# Patient Record
Sex: MALE | Race: WHITE | NOT HISPANIC OR LATINO
[De-identification: names, ages, dates, MRNs, and addresses within clinical notes are randomized per-mention and may not be internally consistent; named-entity substitution may affect disease eponyms.]

---

## 2017-02-08 ENCOUNTER — APPOINTMENT (OUTPATIENT)
Dept: INTERNAL MEDICINE | Facility: CLINIC | Age: 67
End: 2017-02-08

## 2017-02-08 VITALS — WEIGHT: 148 LBS | BODY MASS INDEX: 26.22 KG/M2 | HEIGHT: 63 IN

## 2017-02-08 VITALS — DIASTOLIC BLOOD PRESSURE: 88 MMHG | SYSTOLIC BLOOD PRESSURE: 148 MMHG

## 2017-02-08 VITALS — SYSTOLIC BLOOD PRESSURE: 174 MMHG | DIASTOLIC BLOOD PRESSURE: 81 MMHG

## 2017-04-19 ENCOUNTER — NON-APPOINTMENT (OUTPATIENT)
Age: 67
End: 2017-04-19

## 2017-04-19 ENCOUNTER — LABORATORY RESULT (OUTPATIENT)
Age: 67
End: 2017-04-19

## 2017-04-19 ENCOUNTER — APPOINTMENT (OUTPATIENT)
Dept: INTERNAL MEDICINE | Facility: CLINIC | Age: 67
End: 2017-04-19

## 2017-04-19 VITALS — DIASTOLIC BLOOD PRESSURE: 70 MMHG | SYSTOLIC BLOOD PRESSURE: 130 MMHG

## 2017-04-19 VITALS — BODY MASS INDEX: 25.69 KG/M2 | WEIGHT: 145 LBS | HEIGHT: 63 IN

## 2017-04-19 LAB
BILIRUB UR QL STRIP: NORMAL
CLARITY UR: CLEAR
COLLECTION METHOD: NORMAL
GLUCOSE UR-MCNC: NORMAL
HCG UR QL: 0.2 EU/DL
HGB UR QL STRIP.AUTO: NORMAL
KETONES UR-MCNC: NORMAL
LEUKOCYTE ESTERASE UR QL STRIP: NORMAL
NITRITE UR QL STRIP: NORMAL
PH UR STRIP: 6.5
PROT UR STRIP-MCNC: NORMAL
SP GR UR STRIP: 1

## 2017-04-20 LAB
25(OH)D3 SERPL-MCNC: 13.6 NG/ML
ALBUMIN SERPL ELPH-MCNC: 4.3 G/DL
ALP BLD-CCNC: 59 U/L
ALT SERPL-CCNC: 73 U/L
ANION GAP SERPL CALC-SCNC: 16 MMOL/L
AST SERPL-CCNC: 59 U/L
BASOPHILS # BLD AUTO: 0.02 K/UL
BASOPHILS NFR BLD AUTO: 0.4 %
BILIRUB SERPL-MCNC: 0.2 MG/DL
BUN SERPL-MCNC: 16 MG/DL
CALCIUM SERPL-MCNC: 9.5 MG/DL
CHLORIDE SERPL-SCNC: 95 MMOL/L
CHOLEST SERPL-MCNC: 250 MG/DL
CHOLEST/HDLC SERPL: 13.9 RATIO
CO2 SERPL-SCNC: 22 MMOL/L
CREAT SERPL-MCNC: 1.13 MG/DL
EOSINOPHIL # BLD AUTO: 0.04 K/UL
EOSINOPHIL NFR BLD AUTO: 0.7 %
FERRITIN SERPL-MCNC: 538.1 NG/ML
GLUCOSE SERPL-MCNC: 107 MG/DL
HBA1C MFR BLD HPLC: 5.8 %
HCT VFR BLD CALC: 41.5 %
HDLC SERPL-MCNC: 18 MG/DL
HGB BLD-MCNC: 14.9 G/DL
IMM GRANULOCYTES NFR BLD AUTO: 0.5 %
LDLC SERPL CALC-MCNC: NORMAL MG/DL
LYMPHOCYTES # BLD AUTO: 0.66 K/UL
LYMPHOCYTES NFR BLD AUTO: 11.7 %
MAN DIFF?: NORMAL
MCHC RBC-ENTMCNC: 33.8 PG
MCHC RBC-ENTMCNC: 35.9 GM/DL
MCV RBC AUTO: 94.1 FL
MONOCYTES # BLD AUTO: 0.41 K/UL
MONOCYTES NFR BLD AUTO: 7.3 %
NEUTROPHILS # BLD AUTO: 4.46 K/UL
NEUTROPHILS NFR BLD AUTO: 79.4 %
PLATELET # BLD AUTO: 119 K/UL
POTASSIUM SERPL-SCNC: 4.3 MMOL/L
PROT SERPL-MCNC: 7.1 G/DL
PSA SERPL-MCNC: 3.84 NG/ML
RBC # BLD: 4.41 M/UL
RBC # FLD: 12.4 %
SAVE SPECIMEN: NORMAL
SODIUM SERPL-SCNC: 133 MMOL/L
T3RU NFR SERPL: 1.08 INDEX
TRIGL SERPL-MCNC: 1740 MG/DL
TSH SERPL-ACNC: 3.09 UIU/ML
URATE SERPL-MCNC: 6.9 MG/DL
WBC # FLD AUTO: 5.62 K/UL

## 2017-04-23 ENCOUNTER — FORM ENCOUNTER (OUTPATIENT)
Age: 67
End: 2017-04-23

## 2017-04-24 ENCOUNTER — OUTPATIENT (OUTPATIENT)
Dept: OUTPATIENT SERVICES | Facility: HOSPITAL | Age: 67
LOS: 1 days | End: 2017-04-24
Payer: COMMERCIAL

## 2017-04-24 ENCOUNTER — APPOINTMENT (OUTPATIENT)
Dept: RADIOLOGY | Facility: IMAGING CENTER | Age: 67
End: 2017-04-24

## 2017-04-24 DIAGNOSIS — R07.9 CHEST PAIN, UNSPECIFIED: ICD-10-CM

## 2017-04-24 PROCEDURE — 71046 X-RAY EXAM CHEST 2 VIEWS: CPT

## 2017-05-01 ENCOUNTER — APPOINTMENT (OUTPATIENT)
Dept: INTERNAL MEDICINE | Facility: CLINIC | Age: 67
End: 2017-05-01

## 2017-05-04 ENCOUNTER — APPOINTMENT (OUTPATIENT)
Dept: INTERNAL MEDICINE | Facility: CLINIC | Age: 67
End: 2017-05-04

## 2017-05-04 ENCOUNTER — LABORATORY RESULT (OUTPATIENT)
Age: 67
End: 2017-05-04

## 2017-05-04 LAB
CHOLEST SERPL-MCNC: 180 MG/DL
CHOLEST/HDLC SERPL: 4.5 RATIO
HDLC SERPL-MCNC: 40 MG/DL
LDLC SERPL CALC-MCNC: 84 MG/DL
SODIUM SERPL-SCNC: 140 MMOL/L
TRIGL SERPL-MCNC: 278 MG/DL

## 2017-10-19 ENCOUNTER — LABORATORY RESULT (OUTPATIENT)
Age: 67
End: 2017-10-19

## 2017-10-19 ENCOUNTER — APPOINTMENT (OUTPATIENT)
Dept: INTERNAL MEDICINE | Facility: CLINIC | Age: 67
End: 2017-10-19
Payer: COMMERCIAL

## 2017-10-19 LAB
ALBUMIN SERPL ELPH-MCNC: 4.6 G/DL
ALP BLD-CCNC: 48 U/L
ALT SERPL-CCNC: 32 U/L
ANION GAP SERPL CALC-SCNC: 14 MMOL/L
AST SERPL-CCNC: 24 U/L
BASOPHILS # BLD AUTO: 0.02 K/UL
BASOPHILS NFR BLD AUTO: 0.4 %
BILIRUB SERPL-MCNC: 0.3 MG/DL
BUN SERPL-MCNC: 19 MG/DL
CALCIUM SERPL-MCNC: 9.6 MG/DL
CHLORIDE SERPL-SCNC: 95 MMOL/L
CHOLEST SERPL-MCNC: 177 MG/DL
CHOLEST/HDLC SERPL: 3.6 RATIO
CO2 SERPL-SCNC: 24 MMOL/L
CREAT SERPL-MCNC: 1.25 MG/DL
EOSINOPHIL # BLD AUTO: 0.08 K/UL
EOSINOPHIL NFR BLD AUTO: 1.6 %
GLUCOSE SERPL-MCNC: 164 MG/DL
HCT VFR BLD CALC: 44.1 %
HDLC SERPL-MCNC: 49 MG/DL
HGB BLD-MCNC: 15.1 G/DL
IMM GRANULOCYTES NFR BLD AUTO: 0.6 %
LDLC SERPL CALC-MCNC: 88 MG/DL
LYMPHOCYTES # BLD AUTO: 1.43 K/UL
LYMPHOCYTES NFR BLD AUTO: 28.4 %
MAN DIFF?: NORMAL
MCHC RBC-ENTMCNC: 32.5 PG
MCHC RBC-ENTMCNC: 34.2 GM/DL
MCV RBC AUTO: 95 FL
MONOCYTES # BLD AUTO: 0.46 K/UL
MONOCYTES NFR BLD AUTO: 9.1 %
NEUTROPHILS # BLD AUTO: 3.01 K/UL
NEUTROPHILS NFR BLD AUTO: 59.9 %
PLATELET # BLD AUTO: 155 K/UL
POTASSIUM SERPL-SCNC: 4.4 MMOL/L
PROT SERPL-MCNC: 7.4 G/DL
RBC # BLD: 4.64 M/UL
RBC # FLD: 12.6 %
SAVE SPECIMEN: NORMAL
SODIUM SERPL-SCNC: 133 MMOL/L
T3RU NFR SERPL: 1.01 INDEX
T4 SERPL-MCNC: 5.5 UG/DL
TRIGL SERPL-MCNC: 201 MG/DL
TSH SERPL-ACNC: 5.4 UIU/ML
URATE SERPL-MCNC: 7.5 MG/DL
WBC # FLD AUTO: 5.03 K/UL

## 2017-10-19 PROCEDURE — 36415 COLL VENOUS BLD VENIPUNCTURE: CPT

## 2017-10-20 LAB
25(OH)D3 SERPL-MCNC: 27.5 NG/ML
HBA1C MFR BLD HPLC: 5.7 %

## 2017-10-26 ENCOUNTER — LABORATORY RESULT (OUTPATIENT)
Age: 67
End: 2017-10-26

## 2017-10-26 ENCOUNTER — APPOINTMENT (OUTPATIENT)
Dept: INTERNAL MEDICINE | Facility: CLINIC | Age: 67
End: 2017-10-26
Payer: COMMERCIAL

## 2017-10-26 LAB — SODIUM SERPL-SCNC: 136 MMOL/L

## 2017-10-26 PROCEDURE — 36415 COLL VENOUS BLD VENIPUNCTURE: CPT

## 2017-10-27 LAB — TSH SERPL-ACNC: 4.43 UIU/ML

## 2017-12-05 ENCOUNTER — RX RENEWAL (OUTPATIENT)
Age: 67
End: 2017-12-05

## 2018-03-12 ENCOUNTER — APPOINTMENT (OUTPATIENT)
Dept: HEMATOLOGY ONCOLOGY | Facility: CLINIC | Age: 68
End: 2018-03-12

## 2018-06-01 ENCOUNTER — LABORATORY RESULT (OUTPATIENT)
Age: 68
End: 2018-06-01

## 2018-06-01 ENCOUNTER — APPOINTMENT (OUTPATIENT)
Dept: INTERNAL MEDICINE | Facility: CLINIC | Age: 68
End: 2018-06-01
Payer: MEDICARE

## 2018-06-01 ENCOUNTER — MEDICATION RENEWAL (OUTPATIENT)
Age: 68
End: 2018-06-01

## 2018-06-01 ENCOUNTER — NON-APPOINTMENT (OUTPATIENT)
Age: 68
End: 2018-06-01

## 2018-06-01 VITALS — DIASTOLIC BLOOD PRESSURE: 70 MMHG | SYSTOLIC BLOOD PRESSURE: 130 MMHG

## 2018-06-01 VITALS — BODY MASS INDEX: 25.34 KG/M2 | WEIGHT: 143 LBS | HEIGHT: 63 IN

## 2018-06-01 LAB
BILIRUB UR QL STRIP: NORMAL
CLARITY UR: CLEAR
COLLECTION METHOD: NORMAL
GLUCOSE UR-MCNC: NORMAL
HCG UR QL: 0.2 EU/DL
HGB UR QL STRIP.AUTO: NORMAL
KETONES UR-MCNC: NORMAL
LEUKOCYTE ESTERASE UR QL STRIP: NORMAL
NITRITE UR QL STRIP: NORMAL
PH UR STRIP: 6.5
PROT UR STRIP-MCNC: NORMAL
SP GR UR STRIP: 1.01

## 2018-06-01 PROCEDURE — 36415 COLL VENOUS BLD VENIPUNCTURE: CPT

## 2018-06-01 PROCEDURE — 81003 URINALYSIS AUTO W/O SCOPE: CPT | Mod: QW

## 2018-06-01 PROCEDURE — G0402 INITIAL PREVENTIVE EXAM: CPT

## 2018-06-01 PROCEDURE — 93000 ELECTROCARDIOGRAM COMPLETE: CPT | Mod: 59

## 2018-06-01 PROCEDURE — G0439: CPT

## 2018-06-01 NOTE — PHYSICAL EXAM

## 2018-06-01 NOTE — HEALTH RISK ASSESSMENT
[] : No [No falls in past year] : Patient reported no falls in the past year [0] : 2) Feeling down, depressed, or hopeless: Not at all (0) [[id number]] : 0 [Fully functional (bathing, dressing, toileting, transferring, walking, feeding)] : Fully functional (bathing, dressing, toileting, transferring, walking, feeding) [Fully functional (using the telephone, shopping, preparing meals, housekeeping, doing laundry, using] : Fully functional and needs no help or supervision to perform IADLs (using the telephone, shopping, preparing meals, housekeeping, doing laundry, using transportation, managing medications and managing finances)

## 2018-06-01 NOTE — ASSESSMENT
[FreeTextEntry1] : The patient's physical examination was positive for multiple moles and BPH. The patient was referred to urology, orthopedics, neurology, and dermatology. He was also advised about the need of the Prevnar vaccine, Pneumovax, and shingles

## 2018-06-01 NOTE — HISTORY OF PRESENT ILLNESS
[de-identified] : This is a 67-year-old patient who is here for his annual examination his main complaints today are left sciatica with left-sided back pain. He is also complaining of occasional tremors of both hands. And increasing moles on his body

## 2018-06-05 ENCOUNTER — CLINICAL ADVICE (OUTPATIENT)
Age: 68
End: 2018-06-05

## 2018-06-05 LAB
25(OH)D3 SERPL-MCNC: 25.2 NG/ML
ALBUMIN SERPL ELPH-MCNC: 4.7 G/DL
ALP BLD-CCNC: 56 U/L
ALT SERPL-CCNC: 19 U/L
ANION GAP SERPL CALC-SCNC: 16 MMOL/L
AST SERPL-CCNC: 20 U/L
BASOPHILS # BLD AUTO: 0.02 K/UL
BASOPHILS NFR BLD AUTO: 0.2 %
BILIRUB SERPL-MCNC: 0.3 MG/DL
BUN SERPL-MCNC: 16 MG/DL
CALCIUM SERPL-MCNC: 9.8 MG/DL
CHLORIDE SERPL-SCNC: 97 MMOL/L
CHOLEST SERPL-MCNC: 224 MG/DL
CHOLEST/HDLC SERPL: 4.2 RATIO
CO2 SERPL-SCNC: 24 MMOL/L
CREAT SERPL-MCNC: 1.22 MG/DL
EOSINOPHIL # BLD AUTO: 0.07 K/UL
EOSINOPHIL NFR BLD AUTO: 0.9 %
FERRITIN SERPL-MCNC: 243 NG/ML
GLUCOSE SERPL-MCNC: 115 MG/DL
HBA1C MFR BLD HPLC: 5.9 %
HCT VFR BLD CALC: 43.4 %
HDLC SERPL-MCNC: 53 MG/DL
HGB BLD-MCNC: 14.4 G/DL
IMM GRANULOCYTES NFR BLD AUTO: 0.2 %
LDLC SERPL CALC-MCNC: 139 MG/DL
LYMPHOCYTES # BLD AUTO: 1.5 K/UL
LYMPHOCYTES NFR BLD AUTO: 18.5 %
MAN DIFF?: NORMAL
MCHC RBC-ENTMCNC: 31.4 PG
MCHC RBC-ENTMCNC: 33.2 GM/DL
MCV RBC AUTO: 94.6 FL
MONOCYTES # BLD AUTO: 0.62 K/UL
MONOCYTES NFR BLD AUTO: 7.7 %
NEUTROPHILS # BLD AUTO: 5.87 K/UL
NEUTROPHILS NFR BLD AUTO: 72.5 %
PLATELET # BLD AUTO: 176 K/UL
POTASSIUM SERPL-SCNC: 4.2 MMOL/L
PROT SERPL-MCNC: 7.4 G/DL
PSA SERPL-MCNC: 4.16 NG/ML
RBC # BLD: 4.59 M/UL
RBC # FLD: 12.9 %
SAVE SPECIMEN: NORMAL
SODIUM SERPL-SCNC: 137 MMOL/L
T3RU NFR SERPL: 1.06 INDEX
T4 SERPL-MCNC: 6 UG/DL
TRIGL SERPL-MCNC: 159 MG/DL
TSH SERPL-ACNC: 5 UIU/ML
URATE SERPL-MCNC: 6.1 MG/DL
VIT B12 SERPL-MCNC: 482 PG/ML
WBC # FLD AUTO: 8.1 K/UL

## 2018-09-10 ENCOUNTER — APPOINTMENT (OUTPATIENT)
Dept: UROLOGY | Facility: CLINIC | Age: 68
End: 2018-09-10
Payer: MEDICARE

## 2018-09-10 PROCEDURE — 99213 OFFICE O/P EST LOW 20 MIN: CPT

## 2018-09-11 LAB
APPEARANCE: CLEAR
BACTERIA: NEGATIVE
BILIRUBIN URINE: NEGATIVE
BLOOD URINE: NEGATIVE
COLOR: YELLOW
GLUCOSE QUALITATIVE U: 100 MG/DL
HYALINE CASTS: 1 /LPF
KETONES URINE: ABNORMAL
LEUKOCYTE ESTERASE URINE: NEGATIVE
MICROSCOPIC-UA: NORMAL
NITRITE URINE: NEGATIVE
PH URINE: 6
PROTEIN URINE: NEGATIVE MG/DL
PSA FREE FLD-MCNC: 23.2
PSA FREE SERPL-MCNC: 0.86 NG/ML
PSA SERPL-MCNC: 3.71 NG/ML
RED BLOOD CELLS URINE: 1 /HPF
SPECIFIC GRAVITY URINE: 1.02
SQUAMOUS EPITHELIAL CELLS: 0 /HPF
UROBILINOGEN URINE: NEGATIVE MG/DL
WHITE BLOOD CELLS URINE: 0 /HPF

## 2018-09-12 LAB — BACTERIA UR CULT: NORMAL

## 2018-10-03 ENCOUNTER — LABORATORY RESULT (OUTPATIENT)
Age: 68
End: 2018-10-03

## 2018-10-03 ENCOUNTER — APPOINTMENT (OUTPATIENT)
Dept: INTERNAL MEDICINE | Facility: CLINIC | Age: 68
End: 2018-10-03
Payer: MEDICARE

## 2018-10-03 LAB
BASOPHILS # BLD AUTO: 0.04 K/UL
BASOPHILS NFR BLD AUTO: 0.7 %
EOSINOPHIL # BLD AUTO: 0.07 K/UL
EOSINOPHIL NFR BLD AUTO: 1.2 %
HCT VFR BLD CALC: 45 %
HGB BLD-MCNC: 15.2 G/DL
IMM GRANULOCYTES NFR BLD AUTO: 0.7 %
LYMPHOCYTES # BLD AUTO: 1.15 K/UL
LYMPHOCYTES NFR BLD AUTO: 20.2 %
MAN DIFF?: NORMAL
MCHC RBC-ENTMCNC: 32.5 PG
MCHC RBC-ENTMCNC: 33.8 GM/DL
MCV RBC AUTO: 96.2 FL
MONOCYTES # BLD AUTO: 0.42 K/UL
MONOCYTES NFR BLD AUTO: 7.4 %
NEUTROPHILS # BLD AUTO: 3.98 K/UL
NEUTROPHILS NFR BLD AUTO: 69.8 %
PLATELET # BLD AUTO: 180 K/UL
RBC # BLD: 4.68 M/UL
RBC # FLD: 12.4 %
SAVE SPECIMEN: NORMAL
WBC # FLD AUTO: 5.7 K/UL

## 2018-10-03 PROCEDURE — 90670 PCV13 VACCINE IM: CPT

## 2018-10-03 PROCEDURE — 36415 COLL VENOUS BLD VENIPUNCTURE: CPT

## 2018-10-03 PROCEDURE — G0009: CPT

## 2018-10-14 ENCOUNTER — CLINICAL ADVICE (OUTPATIENT)
Age: 68
End: 2018-10-14

## 2018-10-14 LAB
25(OH)D3 SERPL-MCNC: 25.8 NG/ML
ALBUMIN SERPL ELPH-MCNC: 4.6 G/DL
ALP BLD-CCNC: 45 U/L
ALT SERPL-CCNC: 38 U/L
ANION GAP SERPL CALC-SCNC: 15 MMOL/L
AST SERPL-CCNC: 31 U/L
BILIRUB SERPL-MCNC: 0.5 MG/DL
BUN SERPL-MCNC: 16 MG/DL
CALCIUM SERPL-MCNC: 9.5 MG/DL
CHLORIDE SERPL-SCNC: 95 MMOL/L
CHOLEST SERPL-MCNC: 170 MG/DL
CHOLEST/HDLC SERPL: 3.7 RATIO
CO2 SERPL-SCNC: 24 MMOL/L
CREAT SERPL-MCNC: 1.21 MG/DL
GLUCOSE SERPL-MCNC: 141 MG/DL
HBA1C MFR BLD HPLC: 6 %
HDLC SERPL-MCNC: 46 MG/DL
LDLC SERPL CALC-MCNC: 69 MG/DL
POTASSIUM SERPL-SCNC: 4.9 MMOL/L
PROT SERPL-MCNC: 7.5 G/DL
SODIUM SERPL-SCNC: 134 MMOL/L
T3RU NFR SERPL: 1.04 INDEX
T4 SERPL-MCNC: 5.8 UG/DL
TRIGL SERPL-MCNC: 274 MG/DL
TSH SERPL-ACNC: 5.29 UIU/ML
URATE SERPL-MCNC: 8.2 MG/DL

## 2018-10-17 ENCOUNTER — APPOINTMENT (OUTPATIENT)
Dept: INTERNAL MEDICINE | Facility: CLINIC | Age: 68
End: 2018-10-17
Payer: MEDICARE

## 2018-10-17 ENCOUNTER — LABORATORY RESULT (OUTPATIENT)
Age: 68
End: 2018-10-17

## 2018-10-17 PROCEDURE — 36415 COLL VENOUS BLD VENIPUNCTURE: CPT

## 2018-10-20 ENCOUNTER — RX RENEWAL (OUTPATIENT)
Age: 68
End: 2018-10-20

## 2018-10-20 ENCOUNTER — CLINICAL ADVICE (OUTPATIENT)
Age: 68
End: 2018-10-20

## 2018-10-20 LAB
SODIUM SERPL-SCNC: 136 MMOL/L
TSH SERPL-ACNC: 5.17 UIU/ML

## 2018-11-12 ENCOUNTER — MEDICATION RENEWAL (OUTPATIENT)
Age: 68
End: 2018-11-12

## 2018-11-29 ENCOUNTER — LABORATORY RESULT (OUTPATIENT)
Age: 68
End: 2018-11-29

## 2018-11-29 ENCOUNTER — RX RENEWAL (OUTPATIENT)
Age: 68
End: 2018-11-29

## 2018-11-29 ENCOUNTER — APPOINTMENT (OUTPATIENT)
Dept: INTERNAL MEDICINE | Facility: CLINIC | Age: 68
End: 2018-11-29
Payer: MEDICARE

## 2018-11-29 LAB
SAVE SPECIMEN: NORMAL
T3RU NFR SERPL: 1.12 INDEX
T4 SERPL-MCNC: 6.1 UG/DL
TSH SERPL-ACNC: 5.83 UIU/ML

## 2018-11-29 PROCEDURE — 36415 COLL VENOUS BLD VENIPUNCTURE: CPT

## 2019-01-07 ENCOUNTER — MEDICATION RENEWAL (OUTPATIENT)
Age: 69
End: 2019-01-07

## 2019-06-10 ENCOUNTER — LABORATORY RESULT (OUTPATIENT)
Age: 69
End: 2019-06-10

## 2019-06-10 ENCOUNTER — NON-APPOINTMENT (OUTPATIENT)
Age: 69
End: 2019-06-10

## 2019-06-10 ENCOUNTER — APPOINTMENT (OUTPATIENT)
Dept: INTERNAL MEDICINE | Facility: CLINIC | Age: 69
End: 2019-06-10
Payer: MEDICARE

## 2019-06-10 VITALS — SYSTOLIC BLOOD PRESSURE: 130 MMHG | DIASTOLIC BLOOD PRESSURE: 70 MMHG

## 2019-06-10 VITALS — WEIGHT: 144 LBS | HEIGHT: 63 IN | BODY MASS INDEX: 25.52 KG/M2

## 2019-06-10 DIAGNOSIS — R73.09 OTHER ABNORMAL GLUCOSE: ICD-10-CM

## 2019-06-10 LAB — SAVE SPECIMEN: NORMAL

## 2019-06-10 PROCEDURE — 81003 URINALYSIS AUTO W/O SCOPE: CPT | Mod: QW

## 2019-06-10 PROCEDURE — 93000 ELECTROCARDIOGRAM COMPLETE: CPT

## 2019-06-10 PROCEDURE — 99213 OFFICE O/P EST LOW 20 MIN: CPT | Mod: 25

## 2019-06-10 PROCEDURE — G0439: CPT | Mod: GA

## 2019-06-10 PROCEDURE — 36415 COLL VENOUS BLD VENIPUNCTURE: CPT

## 2019-06-10 NOTE — HEALTH RISK ASSESSMENT
[] : No [No falls in past year] : Patient reported no falls in the past year [0] : 2) Feeling down, depressed, or hopeless: Not at all (0) [[id number]] : 0 [Fully functional (bathing, dressing, toileting, transferring, walking, feeding)] : Fully functional (bathing, dressing, toileting, transferring, walking, feeding) [Fully functional (using the telephone, shopping, preparing meals, housekeeping, doing laundry, using] : Fully functional and needs no help or supervision to perform IADLs (using the telephone, shopping, preparing meals, housekeeping, doing laundry, using transportation, managing medications and managing finances) [Reviewed no changes] : Reviewed no changes [AdvancecareDate] : 6/10/19

## 2019-06-10 NOTE — PHYSICAL EXAM
[No Acute Distress] : no acute distress [Well Nourished] : well nourished [Well Developed] : well developed [Well-Appearing] : well-appearing [PERRL] : pupils equal round and reactive to light [EOMI] : extraocular movements intact [Normal Outer Ear/Nose] : the outer ears and nose were normal in appearance [Normal Oropharynx] : the oropharynx was normal [Supple] : supple [No Lymphadenopathy] : no lymphadenopathy [Thyroid Normal, No Nodules] : the thyroid was normal and there were no nodules present [Clear to Auscultation] : lungs were clear to auscultation bilaterally [No Accessory Muscle Use] : no accessory muscle use [Regular Rhythm] : with a regular rhythm [Normal S1, S2] : normal S1 and S2 [No Murmur] : no murmur heard [No Carotid Bruits] : no carotid bruits [Pedal Pulses Present] : the pedal pulses are present [Soft] : abdomen soft [Non Tender] : non-tender [Non-distended] : non-distended [No Masses] : no abdominal mass palpated [No HSM] : no HSM [No Hernias] : no hernias [Normal Sphincter Tone] : normal sphincter tone [Stool Occult Blood] : stool negative for occult blood [Normal Posterior Cervical Nodes] : no posterior cervical lymphadenopathy [Normal Anterior Cervical Nodes] : no anterior cervical lymphadenopathy [No CVA Tenderness] : no CVA  tenderness [No Spinal Tenderness] : no spinal tenderness [No Joint Swelling] : no joint swelling [Grossly Normal Strength/Tone] : grossly normal strength/tone [No Rash] : no rash [Normal Gait] : normal gait [No Focal Deficits] : no focal deficits [Coordination Grossly Intact] : coordination grossly intact [Deep Tendon Reflexes (DTR)] : deep tendon reflexes were 2+ and symmetric [Normal Affect] : the affect was normal [Normal Insight/Judgement] : insight and judgment were intact [de-identified] : BPH [de-identified] : Multiple moles

## 2019-06-10 NOTE — HISTORY OF PRESENT ILLNESS
[de-identified] : This is a 68-year-old gentleman with a history of hypercholesterolemia, hypothyroidism, elevated A1c, BPH who seen today for his annual examination. He feels well and denies any complaints. His last colonoscopy was 2 years ago

## 2019-06-10 NOTE — ASSESSMENT
[FreeTextEntry1] : The patient's vital signs were stable his physical examination is positive for an enlarged prostate. I referred him to urology and a PSA level was one in addition I advised him that he is due for his pneumococcal vaccine in October of 2019

## 2019-06-11 ENCOUNTER — TRANSCRIPTION ENCOUNTER (OUTPATIENT)
Age: 69
End: 2019-06-11

## 2019-06-11 ENCOUNTER — RX RENEWAL (OUTPATIENT)
Age: 69
End: 2019-06-11

## 2019-06-15 ENCOUNTER — CLINICAL ADVICE (OUTPATIENT)
Age: 69
End: 2019-06-15

## 2019-06-15 LAB
25(OH)D3 SERPL-MCNC: 22.5 NG/ML
ALBUMIN SERPL ELPH-MCNC: 4.9 G/DL
ALP BLD-CCNC: 51 U/L
ALT SERPL-CCNC: 32 U/L
ANION GAP SERPL CALC-SCNC: 14 MMOL/L
AST SERPL-CCNC: 24 U/L
BASOPHILS # BLD AUTO: 0.04 K/UL
BASOPHILS NFR BLD AUTO: 0.6 %
BILIRUB SERPL-MCNC: 0.4 MG/DL
BILIRUB UR QL STRIP: NORMAL
BUN SERPL-MCNC: 17 MG/DL
CALCIUM SERPL-MCNC: 8.6 MG/DL
CHLORIDE SERPL-SCNC: 98 MMOL/L
CHOLEST SERPL-MCNC: 199 MG/DL
CHOLEST/HDLC SERPL: 4 RATIO
CLARITY UR: CLEAR
CO2 SERPL-SCNC: 26 MMOL/L
COLLECTION METHOD: NORMAL
CREAT SERPL-MCNC: 1.02 MG/DL
EOSINOPHIL # BLD AUTO: 0.07 K/UL
EOSINOPHIL NFR BLD AUTO: 1.1 %
ESTIMATED AVERAGE GLUCOSE: 123 MG/DL
FERRITIN SERPL-MCNC: 437 NG/ML
FOLATE SERPL-MCNC: 15.9 NG/ML
GLUCOSE SERPL-MCNC: 125 MG/DL
GLUCOSE UR-MCNC: NORMAL
HBA1C MFR BLD HPLC: 5.9 %
HCG UR QL: 0.2 EU/DL
HCT VFR BLD CALC: 47.5 %
HDLC SERPL-MCNC: 50 MG/DL
HGB BLD-MCNC: 15.7 G/DL
HGB UR QL STRIP.AUTO: NORMAL
IMM GRANULOCYTES NFR BLD AUTO: 0.8 %
KETONES UR-MCNC: NORMAL
LDLC SERPL CALC-MCNC: 112 MG/DL
LEUKOCYTE ESTERASE UR QL STRIP: NORMAL
LYMPHOCYTES # BLD AUTO: 1 K/UL
LYMPHOCYTES NFR BLD AUTO: 16.1 %
MAN DIFF?: NORMAL
MCHC RBC-ENTMCNC: 32.3 PG
MCHC RBC-ENTMCNC: 33.1 GM/DL
MCV RBC AUTO: 97.7 FL
MONOCYTES # BLD AUTO: 0.62 K/UL
MONOCYTES NFR BLD AUTO: 10 %
NEUTROPHILS # BLD AUTO: 4.43 K/UL
NEUTROPHILS NFR BLD AUTO: 71.4 %
NITRITE UR QL STRIP: NORMAL
PH UR STRIP: 6
PLATELET # BLD AUTO: 149 K/UL
POTASSIUM SERPL-SCNC: 4.9 MMOL/L
PROT SERPL-MCNC: 7.3 G/DL
PROT UR STRIP-MCNC: NORMAL
PSA SERPL-MCNC: 5.12 NG/ML
RBC # BLD: 4.86 M/UL
RBC # FLD: 12.2 %
SODIUM SERPL-SCNC: 138 MMOL/L
SP GR UR STRIP: 1.01
T3RU NFR SERPL: 1 TBI
T4 SERPL-MCNC: 5 UG/DL
TRIGL SERPL-MCNC: 184 MG/DL
TSH SERPL-ACNC: 2.84 UIU/ML
URATE SERPL-MCNC: 7.6 MG/DL
VIT B12 SERPL-MCNC: 464 PG/ML
WBC # FLD AUTO: 6.21 K/UL

## 2019-06-21 ENCOUNTER — CLINICAL ADVICE (OUTPATIENT)
Age: 69
End: 2019-06-21

## 2019-07-03 ENCOUNTER — LABORATORY RESULT (OUTPATIENT)
Age: 69
End: 2019-07-03

## 2019-07-03 ENCOUNTER — APPOINTMENT (OUTPATIENT)
Dept: INTERNAL MEDICINE | Facility: CLINIC | Age: 69
End: 2019-07-03
Payer: MEDICARE

## 2019-07-03 PROCEDURE — 36415 COLL VENOUS BLD VENIPUNCTURE: CPT

## 2019-07-04 LAB
BASOPHILS # BLD AUTO: 0.04 K/UL
BASOPHILS NFR BLD AUTO: 0.7 %
EOSINOPHIL # BLD AUTO: 0.04 K/UL
EOSINOPHIL NFR BLD AUTO: 0.7 %
ERYTHROCYTE [SEDIMENTATION RATE] IN BLOOD BY WESTERGREN METHOD: 2 MM/HR
HCT VFR BLD CALC: 45.8 %
HGB BLD-MCNC: 15.2 G/DL
IMM GRANULOCYTES NFR BLD AUTO: 0.7 %
LYMPHOCYTES # BLD AUTO: 1 K/UL
LYMPHOCYTES NFR BLD AUTO: 17.3 %
MAN DIFF?: NORMAL
MCHC RBC-ENTMCNC: 32.7 PG
MCHC RBC-ENTMCNC: 33.2 GM/DL
MCV RBC AUTO: 98.5 FL
MONOCYTES # BLD AUTO: 0.62 K/UL
MONOCYTES NFR BLD AUTO: 10.7 %
NEUTROPHILS # BLD AUTO: 4.04 K/UL
NEUTROPHILS NFR BLD AUTO: 69.9 %
PLATELET # BLD AUTO: 162 K/UL
RBC # BLD: 4.65 M/UL
RBC # FLD: 12 %
WBC # FLD AUTO: 5.78 K/UL

## 2019-11-25 ENCOUNTER — APPOINTMENT (OUTPATIENT)
Dept: UROLOGY | Facility: CLINIC | Age: 69
End: 2019-11-25

## 2019-12-02 ENCOUNTER — APPOINTMENT (OUTPATIENT)
Dept: INTERNAL MEDICINE | Facility: CLINIC | Age: 69
End: 2019-12-02
Payer: MEDICARE

## 2019-12-02 ENCOUNTER — FORM ENCOUNTER (OUTPATIENT)
Age: 69
End: 2019-12-02

## 2019-12-02 VITALS — DIASTOLIC BLOOD PRESSURE: 70 MMHG | SYSTOLIC BLOOD PRESSURE: 130 MMHG

## 2019-12-02 VITALS — WEIGHT: 150 LBS | BODY MASS INDEX: 26.58 KG/M2 | HEIGHT: 63 IN

## 2019-12-02 DIAGNOSIS — M51.26 OTHER INTERVERTEBRAL DISC DISPLACEMENT, LUMBAR REGION: ICD-10-CM

## 2019-12-02 PROCEDURE — 99213 OFFICE O/P EST LOW 20 MIN: CPT

## 2019-12-02 NOTE — HISTORY OF PRESENT ILLNESS
[de-identified] : This is a 69-year-old gentleman who lifted a heavy object 2 days ago is complaining of severe lumbar pain radiating to both buttocks. He denies any loss of function. He denies any urinary dysfunction

## 2019-12-02 NOTE — ASSESSMENT
[FreeTextEntry1] : His physical examination was normal. Detailed neurological examination was also normal. I ordered an MRI of his lumbosacral spine. I started him on a Medrol pack and is also interested in bed with an ice pack

## 2019-12-03 ENCOUNTER — OUTPATIENT (OUTPATIENT)
Dept: OUTPATIENT SERVICES | Facility: HOSPITAL | Age: 69
LOS: 1 days | End: 2019-12-03
Payer: MEDICARE

## 2019-12-03 ENCOUNTER — APPOINTMENT (OUTPATIENT)
Dept: MRI IMAGING | Facility: CLINIC | Age: 69
End: 2019-12-03
Payer: MEDICARE

## 2019-12-03 DIAGNOSIS — M54.5 LOW BACK PAIN: ICD-10-CM

## 2019-12-03 PROCEDURE — 72148 MRI LUMBAR SPINE W/O DYE: CPT

## 2019-12-03 PROCEDURE — 72148 MRI LUMBAR SPINE W/O DYE: CPT | Mod: 26

## 2019-12-04 PROBLEM — M51.26 LUMBAR HERNIATED DISC: Status: ACTIVE | Noted: 2019-12-04

## 2019-12-13 ENCOUNTER — RX RENEWAL (OUTPATIENT)
Age: 69
End: 2019-12-13

## 2019-12-16 ENCOUNTER — APPOINTMENT (OUTPATIENT)
Dept: SPINE | Facility: CLINIC | Age: 69
End: 2019-12-16
Payer: MEDICARE

## 2019-12-16 VITALS
OXYGEN SATURATION: 98 % | SYSTOLIC BLOOD PRESSURE: 131 MMHG | HEART RATE: 101 BPM | HEIGHT: 63 IN | WEIGHT: 145 LBS | TEMPERATURE: 99.2 F | DIASTOLIC BLOOD PRESSURE: 79 MMHG | BODY MASS INDEX: 25.69 KG/M2

## 2019-12-16 DIAGNOSIS — Z82.0 FAMILY HISTORY OF EPILEPSY AND OTHER DISEASES OF THE NERVOUS SYSTEM: ICD-10-CM

## 2019-12-16 DIAGNOSIS — Z82.49 FAMILY HISTORY OF ISCHEMIC HEART DISEASE AND OTHER DISEASES OF THE CIRCULATORY SYSTEM: ICD-10-CM

## 2019-12-16 DIAGNOSIS — R73.03 PREDIABETES.: ICD-10-CM

## 2019-12-16 DIAGNOSIS — D36.10 BENIGN NEOPLASM OF PERIPHERAL NERVES AND AUTONOMIC NERVOUS SYSTEM, UNSPECIFIED: ICD-10-CM

## 2019-12-16 DIAGNOSIS — Z86.39 PERSONAL HISTORY OF OTHER ENDOCRINE, NUTRITIONAL AND METABOLIC DISEASE: ICD-10-CM

## 2019-12-16 PROCEDURE — 99203 OFFICE O/P NEW LOW 30 MIN: CPT

## 2019-12-17 NOTE — CONSULT LETTER
[Dear  ___] : Dear  [unfilled], [Consult Letter:] : I had the pleasure of evaluating your patient, [unfilled]. [Please see my note below.] : Please see my note below. [Consult Closing:] : Thank you very much for allowing me to participate in the care of this patient.  If you have any questions, please do not hesitate to contact me. [Sincerely,] : Sincerely, [FreeTextEntry3] : Rickey Skinner MD\par Chief of Neurosurgery [facility]\par [facility]\par

## 2019-12-17 NOTE — PHYSICAL EXAM
[Place] : oriented to place [Person] : oriented to person [Short Term Intact] : short term memory intact [Time] : oriented to time [Remote Intact] : remote memory intact [Concentration Intact] : normal concentrating ability [Span Intact] : the attention span was normal [Fluency] : fluency intact [Comprehension] : comprehension intact [Vocabulary] : adequate range of vocabulary [Current Events] : adequate knowledge of current events [Past History] : adequate knowledge of personal past history [Cranial Nerves Oculomotor (III)] : extraocular motion intact [Cranial Nerves Optic (II)] : visual acuity intact bilaterally,  pupils equal round and reactive to light [Cranial Nerves Facial (VII)] : face symmetrical [Cranial Nerves Trigeminal (V)] : facial sensation intact symmetrically [Cranial Nerves Vestibulocochlear (VIII)] : hearing was intact bilaterally [Cranial Nerves Accessory (XI - Cranial And Spinal)] : head turning and shoulder shrug symmetric [Cranial Nerves Glossopharyngeal (IX)] : tongue and palate midline [Cranial Nerves Hypoglossal (XII)] : there was no tongue deviation with protrusion [No Muscle Atrophy] : normal bulk in all four extremities [Motor Strength] : muscle strength was normal in all four extremities [Motor Tone] : muscle tone was normal in all four extremities [Sensation Tactile Decrease] : light touch was intact [Abnormal Walk] : normal gait [Balance] : balance was intact [2+] : Ankle jerk left 2+ [Full ROM] : full ROM [No Tenderness to Palpation] : no spine tenderness on palpation [No Pain with ROM] : no pain with motion in any direction [Able to toe walk] : the patient was able to toe walk [Able to heel walk] : the patient was able to heel walk [Past-pointing] : there was no past-pointing [Tremor] : no tremor present [Plantar Reflex Right Only] : normal on the right [Plantar Reflex Left Only] : normal on the left [Tolbert] : Tolbert's sign was not demonstrated [Straight-Leg Raise Test - Right] : straight leg raise  of the right leg was negative [Straight-Leg Raise Test - Left] : straight leg raise of the left leg was negative

## 2019-12-17 NOTE — REASON FOR VISIT
[New Patient Visit] : a new patient visit [Referred By: _________] : Patient was referred by GABRIELLE [Family Member] : family member [FreeTextEntry1] : Lumbar Schwannoma

## 2019-12-17 NOTE — ASSESSMENT
[FreeTextEntry1] : 68 yo male with an acute onset of lower spine pain and bilateral leg  radiculopathy in September 2019.    An L3 small schwannoma and an L3 L4 herniated disc was identified.  He is currently pain free totally asymptomatic. Normal neurologic exam.  No surgery is indicated at this time.  If his symptoms return he will follow up and a contrast MRI of the lumbar spine will be ordered.

## 2019-12-17 NOTE — HISTORY OF PRESENT ILLNESS
[> 3 months] : more  than 3 months [FreeTextEntry1] : Lumbar Radiculopathy  [de-identified] : 68 yo male with a four month history of lower back pain with bilateral leg pain and tingling.   The pain was a 10/10.  No reports of bowel or bladder symptoms.  He had seen his PCP who ordered an MRI and placed the patient on steroids.  His pain has resolved since a medrol dose ta was initiated.  He has no difficulty walking.  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  Presently the patient is totally pain free. He has no numbness, tingling or weakness. His MRI scan showed a large L3-4 disc herniation and possible tiny L3 nerve sheath tumor.

## 2020-01-06 ENCOUNTER — APPOINTMENT (OUTPATIENT)
Dept: UROLOGY | Facility: CLINIC | Age: 70
End: 2020-01-06
Payer: MEDICARE

## 2020-01-06 VITALS — HEIGHT: 63 IN | WEIGHT: 140 LBS | BODY MASS INDEX: 24.8 KG/M2

## 2020-01-06 PROCEDURE — 99213 OFFICE O/P EST LOW 20 MIN: CPT

## 2020-01-07 ENCOUNTER — RECORD ABSTRACTING (OUTPATIENT)
Age: 70
End: 2020-01-07

## 2020-01-07 DIAGNOSIS — R07.9 CHEST PAIN, UNSPECIFIED: ICD-10-CM

## 2020-01-07 DIAGNOSIS — M54.5 LOW BACK PAIN: ICD-10-CM

## 2020-01-07 LAB
ANION GAP SERPL CALC-SCNC: 16 MMOL/L
BUN SERPL-MCNC: 17 MG/DL
CALCIUM SERPL-MCNC: 9.5 MG/DL
CHLORIDE SERPL-SCNC: 95 MMOL/L
CO2 SERPL-SCNC: 25 MMOL/L
CREAT SERPL-MCNC: 1.16 MG/DL
GLUCOSE SERPL-MCNC: 146 MG/DL
POTASSIUM SERPL-SCNC: 4.6 MMOL/L
PSA FREE FLD-MCNC: 13 %
PSA FREE SERPL-MCNC: 0.78 NG/ML
PSA SERPL-MCNC: 5.78 NG/ML
SODIUM SERPL-SCNC: 136 MMOL/L

## 2020-01-08 NOTE — PHYSICAL EXAM
[General Appearance - Well Developed] : well developed [General Appearance - Well Nourished] : well nourished [Normal Appearance] : normal appearance [Well Groomed] : well groomed [General Appearance - In No Acute Distress] : no acute distress [Abdomen Soft] : soft [Abdomen Tenderness] : non-tender [Costovertebral Angle Tenderness] : no ~M costovertebral angle tenderness [Urethral Meatus] : meatus normal [Urinary Bladder Findings] : the bladder was normal on palpation [Scrotum] : the scrotum was normal [Testes Mass (___cm)] : there were no testicular masses [No Prostate Nodules] : no prostate nodules [Respiration, Rhythm And Depth] : normal respiratory rhythm and effort [] : no respiratory distress [Edema] : no peripheral edema [Exaggerated Use Of Accessory Muscles For Inspiration] : no accessory muscle use [Oriented To Time, Place, And Person] : oriented to person, place, and time [Affect] : the affect was normal [Normal Station and Gait] : the gait and station were normal for the patient's age [Mood] : the mood was normal [Not Anxious] : not anxious [No Focal Deficits] : no focal deficits [No Palpable Adenopathy] : no palpable adenopathy

## 2020-01-08 NOTE — ADDENDUM
[FreeTextEntry1] : Entered by Cody Matthews, acting as scribe for Dr. Javid Bourne.\par \par The documentation recorded by the scribe accurately reflects the service I personally performed and the decisions made by me.

## 2020-01-08 NOTE — LETTER BODY
[FreeTextEntry1] : Jaime Bello MD\par [address] [facility]\par [address]\[place] [address]\par P [phone]\par F [phone]\par \par Dear Dr. Bello,\par \par Reason for visit: Elevated PSA.\par \par This is a 69 -year-old gentleman with elevated PSA. He reports previous negative prostate biopsy with Dr. Zamarripa. Patient returns today for followup. Since his last visit, he denies any urinary complaints. He reports that he is pre-diabetic and has some difficulty managing his blood sugar. His hemoglobin A1c is stable at 5.9%. He denies any nocturia. Patient denies any changes in health. He denies any hematuria or dysuria.The past medical history, family history and social history are unchanged. All other review of systems are negative. Patient denies any changes in medications. Medication list was reconciled.\par \par On examination, the patient is a healthy-appearing gentleman in no acute distress. He is alert and oriented follows commands. He has normal mood and affect. He is normocephalic. Oral no thrush. Neck is supple. Respirations are unlabored. His abdomen is soft and nontender. Liver is nonpalpable. Bladder is nonpalpable. No CVA tenderness. Neurologically he is grossly intact. No peripheral edema. Skin without gross abnormality. He has normal male external genitalia. Normal meatus. Bilateral testes are descended intrascrotally and normal to palpation. On rectal examination, there is normal sphincter tone. The prostate is clinically benign without focal induration or nodularity.\par \par His PSA was 5.12 in June 2019, previously 3.71 in Sept 2018. Previous PSA in June 2018 and April 2017 were 4.16 and 3.83, respectively.\par His hemoglobin A1c is 5.9%, which is stable. \par \par Assessment: Elevated PSA.\par \par I counseled the patient. I reassured the patient given his history of PSA fluctuation. I recommend he repeat PSA profile to confirm diagnosis. If his PSA remains elevated he may consider prostate MRI. I counseled the patient on the procedure. In terms of his pre-diabetes, I asked that he continue monitoring his blood sugar. I counseled the patient on proper glycemic blood management. Risks and alternatives were discussed. I answered the patient questions. The patient will follow-up as directed and will contact me with any questions or concerns. Thank you for the opportunity to participate in the care of this patient. I'll keep you updated on his progress.\par \par Plan: PSA. Consider prostate MRI. Follow up as directed. \par \par PSA 5.78. I spoke with patient regarding test results. I recommended he proceed with prostate MRI. The patient will followup as directed and call me with any questions or concerns.

## 2020-01-26 ENCOUNTER — FORM ENCOUNTER (OUTPATIENT)
Age: 70
End: 2020-01-26

## 2020-01-27 ENCOUNTER — APPOINTMENT (OUTPATIENT)
Dept: MRI IMAGING | Facility: IMAGING CENTER | Age: 70
End: 2020-01-27
Payer: MEDICARE

## 2020-01-27 ENCOUNTER — OUTPATIENT (OUTPATIENT)
Dept: OUTPATIENT SERVICES | Facility: HOSPITAL | Age: 70
LOS: 1 days | End: 2020-01-27
Payer: MEDICARE

## 2020-01-27 DIAGNOSIS — R07.9 CHEST PAIN, UNSPECIFIED: ICD-10-CM

## 2020-01-27 DIAGNOSIS — R53.83 OTHER FATIGUE: ICD-10-CM

## 2020-01-27 DIAGNOSIS — M54.5 LOW BACK PAIN: ICD-10-CM

## 2020-01-27 DIAGNOSIS — M50.20 OTHER CERVICAL DISC DISPLACEMENT, UNSPECIFIED CERVICAL REGION: ICD-10-CM

## 2020-01-27 DIAGNOSIS — R97.20 ELEVATED PROSTATE SPECIFIC ANTIGEN [PSA]: ICD-10-CM

## 2020-01-27 PROCEDURE — 72197 MRI PELVIS W/O & W/DYE: CPT

## 2020-01-27 PROCEDURE — A9585: CPT

## 2020-01-27 PROCEDURE — 72197 MRI PELVIS W/O & W/DYE: CPT | Mod: 26

## 2020-03-13 ENCOUNTER — APPOINTMENT (OUTPATIENT)
Dept: INTERNAL MEDICINE | Facility: CLINIC | Age: 70
End: 2020-03-13
Payer: MEDICARE

## 2020-03-13 PROCEDURE — G0009: CPT

## 2020-03-13 PROCEDURE — 90732 PPSV23 VACC 2 YRS+ SUBQ/IM: CPT

## 2020-06-15 ENCOUNTER — RX RENEWAL (OUTPATIENT)
Age: 70
End: 2020-06-15

## 2020-07-17 LAB
ALBUMIN SERPL ELPH-MCNC: 4.8 G/DL
ALP BLD-CCNC: 59 U/L
ALT SERPL-CCNC: 51 U/L
ANION GAP SERPL CALC-SCNC: 15 MMOL/L
APPEARANCE: CLEAR
APPEARANCE: CLEAR
AST SERPL-CCNC: 42 U/L
BASOPHILS # BLD AUTO: 0.05 K/UL
BASOPHILS NFR BLD AUTO: 0.7 %
BILIRUB SERPL-MCNC: 0.3 MG/DL
BILIRUBIN URINE: NEGATIVE
BILIRUBIN URINE: NEGATIVE
BLOOD URINE: NEGATIVE
BLOOD URINE: NEGATIVE
BUN SERPL-MCNC: 14 MG/DL
CALCIUM SERPL-MCNC: 9.2 MG/DL
CHLORIDE SERPL-SCNC: 95 MMOL/L
CHOLEST SERPL-MCNC: 173 MG/DL
CHOLEST/HDLC SERPL: 3.7 RATIO
CO2 SERPL-SCNC: 24 MMOL/L
COLOR: NORMAL
COLOR: NORMAL
CREAT SERPL-MCNC: 1.02 MG/DL
EOSINOPHIL # BLD AUTO: 0.13 K/UL
EOSINOPHIL NFR BLD AUTO: 1.7 %
ESTIMATED AVERAGE GLUCOSE: 108 MG/DL
GLUCOSE QUALITATIVE U: NEGATIVE
GLUCOSE QUALITATIVE U: NEGATIVE
GLUCOSE SERPL-MCNC: 127 MG/DL
HBA1C MFR BLD HPLC: 5.4 %
HCT VFR BLD CALC: 46.9 %
HDLC SERPL-MCNC: 47 MG/DL
HGB BLD-MCNC: 15.5 G/DL
IMM GRANULOCYTES NFR BLD AUTO: 0.9 %
KETONES URINE: ABNORMAL
KETONES URINE: ABNORMAL
LDLC SERPL CALC-MCNC: 64 MG/DL
LEUKOCYTE ESTERASE URINE: NEGATIVE
LEUKOCYTE ESTERASE URINE: NEGATIVE
LYMPHOCYTES # BLD AUTO: 1.18 K/UL
LYMPHOCYTES NFR BLD AUTO: 15.8 %
MAN DIFF?: NORMAL
MCHC RBC-ENTMCNC: 32.2 PG
MCHC RBC-ENTMCNC: 33 GM/DL
MCV RBC AUTO: 97.3 FL
MONOCYTES # BLD AUTO: 0.86 K/UL
MONOCYTES NFR BLD AUTO: 11.5 %
NEUTROPHILS # BLD AUTO: 5.19 K/UL
NEUTROPHILS NFR BLD AUTO: 69.4 %
NITRITE URINE: NEGATIVE
NITRITE URINE: NEGATIVE
PH URINE: 6
PH URINE: 6
PLATELET # BLD AUTO: 152 K/UL
POTASSIUM SERPL-SCNC: 4.8 MMOL/L
PROT SERPL-MCNC: 7.3 G/DL
PROTEIN URINE: NEGATIVE
PROTEIN URINE: NEGATIVE
RBC # BLD: 4.82 M/UL
RBC # FLD: 12 %
SAVE SPECIMEN: NORMAL
SODIUM SERPL-SCNC: 134 MMOL/L
SPECIFIC GRAVITY URINE: 1.01
SPECIFIC GRAVITY URINE: 1.01
TRIGL SERPL-MCNC: 313 MG/DL
UROBILINOGEN URINE: NORMAL
UROBILINOGEN URINE: NORMAL
WBC # FLD AUTO: 7.48 K/UL

## 2020-07-29 DIAGNOSIS — R74.0 NONSPECIFIC ELEVATION OF LEVELS OF TRANSAMINASE AND LACTIC ACID DEHYDROGENASE [LDH]: ICD-10-CM

## 2020-09-03 DIAGNOSIS — Z20.828 CONTACT WITH AND (SUSPECTED) EXPOSURE TO OTHER VIRAL COMMUNICABLE DISEASES: ICD-10-CM

## 2020-09-03 LAB
ALBUMIN SERPL ELPH-MCNC: 4.8 G/DL
ALP BLD-CCNC: 46 U/L
ALT SERPL-CCNC: 53 U/L
ANION GAP SERPL CALC-SCNC: 10 MMOL/L
AST SERPL-CCNC: 33 U/L
BILIRUB SERPL-MCNC: 0.4 MG/DL
BUN SERPL-MCNC: 15 MG/DL
CALCIUM SERPL-MCNC: 9.3 MG/DL
CHLORIDE SERPL-SCNC: 96 MMOL/L
CO2 SERPL-SCNC: 27 MMOL/L
CREAT SERPL-MCNC: 1.09 MG/DL
GGT SERPL-CCNC: 79 U/L
POTASSIUM SERPL-SCNC: 4.5 MMOL/L
PROT SERPL-MCNC: 7 G/DL
SODIUM SERPL-SCNC: 133 MMOL/L

## 2020-09-21 LAB — SODIUM SERPL-SCNC: 134 MMOL/L

## 2020-12-15 ENCOUNTER — RX RENEWAL (OUTPATIENT)
Age: 70
End: 2020-12-15

## 2021-03-16 ENCOUNTER — LABORATORY RESULT (OUTPATIENT)
Age: 71
End: 2021-03-16

## 2021-03-16 ENCOUNTER — APPOINTMENT (OUTPATIENT)
Dept: INTERNAL MEDICINE | Facility: CLINIC | Age: 71
End: 2021-03-16
Payer: MEDICARE

## 2021-03-16 ENCOUNTER — NON-APPOINTMENT (OUTPATIENT)
Age: 71
End: 2021-03-16

## 2021-03-16 VITALS — BODY MASS INDEX: 25.16 KG/M2 | HEIGHT: 63 IN | WEIGHT: 142 LBS | TEMPERATURE: 97.6 F

## 2021-03-16 PROCEDURE — 99213 OFFICE O/P EST LOW 20 MIN: CPT | Mod: 25

## 2021-03-16 PROCEDURE — 36415 COLL VENOUS BLD VENIPUNCTURE: CPT

## 2021-03-16 PROCEDURE — 93000 ELECTROCARDIOGRAM COMPLETE: CPT | Mod: 59

## 2021-03-16 PROCEDURE — G0439: CPT | Mod: GA

## 2021-03-16 NOTE — ASSESSMENT
[FreeTextEntry1] : Problems\par Elevated PSA\par Mood disorder\par Herniated cervical disc\par Hypercholesterolemia hypothyroidism\par Assessment\par His blood pressure was well controlled his physical examination was normal except for obesity.  And bloods were obtained\par \par The patient was referred to cardiology for evaluation of his tachycardia

## 2021-03-16 NOTE — HISTORY OF PRESENT ILLNESS
[de-identified] : This is a 70-year-old patient with a history of elevated PSA level, herniated cervical disc, hypercholesterolemia, thyroid disorder, mood disorder who is here today for his annual well exam

## 2021-03-16 NOTE — PHYSICAL EXAM
[Normal Sphincter Tone] : normal sphincter tone [No Mass] : no mass [Testes Mass (___cm)] : there were no testicular masses [Prostate Enlargement] : the prostate was not enlarged [Prostate Tenderness] : the prostate was not tender [No Prostate Nodules] : no prostate nodules [Normal] : normal gait, coordination grossly intact, no focal deficits and deep tendon reflexes were 2+ and symmetric

## 2021-03-16 NOTE — HEALTH RISK ASSESSMENT
[No] : No [No falls in past year] : Patient reported no falls in the past year [0] : 2) Feeling down, depressed, or hopeless: Not at all (0) [Fully functional (bathing, dressing, toileting, transferring, walking, feeding)] : Fully functional (bathing, dressing, toileting, transferring, walking, feeding) [Fully functional (using the telephone, shopping, preparing meals, housekeeping, doing laundry, using] : Fully functional and needs no help or supervision to perform IADLs (using the telephone, shopping, preparing meals, housekeeping, doing laundry, using transportation, managing medications and managing finances) [] : No [[id number]] : 0

## 2021-03-17 LAB
25(OH)D3 SERPL-MCNC: 70.3 NG/ML
ALBUMIN SERPL ELPH-MCNC: 4.8 G/DL
ALP BLD-CCNC: 60 U/L
ALT SERPL-CCNC: 39 U/L
ANION GAP SERPL CALC-SCNC: 13 MMOL/L
APPEARANCE: CLEAR
AST SERPL-CCNC: 30 U/L
BACTERIA: NEGATIVE
BASOPHILS # BLD AUTO: 0.06 K/UL
BASOPHILS NFR BLD AUTO: 0.7 %
BILIRUB SERPL-MCNC: 0.5 MG/DL
BILIRUBIN URINE: NEGATIVE
BLOOD URINE: NEGATIVE
BUN SERPL-MCNC: 21 MG/DL
CALCIUM SERPL-MCNC: 9.5 MG/DL
CHLORIDE SERPL-SCNC: 95 MMOL/L
CHOLEST SERPL-MCNC: 202 MG/DL
CO2 SERPL-SCNC: 26 MMOL/L
COLOR: YELLOW
CREAT SERPL-MCNC: 1.12 MG/DL
EOSINOPHIL # BLD AUTO: 0.08 K/UL
EOSINOPHIL NFR BLD AUTO: 1 %
ESTIMATED AVERAGE GLUCOSE: 117 MG/DL
FOLATE SERPL-MCNC: >20 NG/ML
GLUCOSE QUALITATIVE U: NEGATIVE
GLUCOSE SERPL-MCNC: 148 MG/DL
HBA1C MFR BLD HPLC: 5.7 %
HCT VFR BLD CALC: 47.3 %
HDLC SERPL-MCNC: 49 MG/DL
HGB BLD-MCNC: 15.9 G/DL
HYALINE CASTS: 0 /LPF
IMM GRANULOCYTES NFR BLD AUTO: 0.7 %
KETONES URINE: NEGATIVE
LDLC SERPL CALC-MCNC: 87 MG/DL
LEUKOCYTE ESTERASE URINE: NEGATIVE
LYMPHOCYTES # BLD AUTO: 0.99 K/UL
LYMPHOCYTES NFR BLD AUTO: 12.2 %
MAN DIFF?: NORMAL
MCHC RBC-ENTMCNC: 32.7 PG
MCHC RBC-ENTMCNC: 33.6 GM/DL
MCV RBC AUTO: 97.3 FL
MICROSCOPIC-UA: NORMAL
MONOCYTES # BLD AUTO: 0.97 K/UL
MONOCYTES NFR BLD AUTO: 12 %
NEUTROPHILS # BLD AUTO: 5.95 K/UL
NEUTROPHILS NFR BLD AUTO: 73.4 %
NITRITE URINE: NEGATIVE
NONHDLC SERPL-MCNC: 152 MG/DL
PH URINE: 6.5
PLATELET # BLD AUTO: 153 K/UL
POTASSIUM SERPL-SCNC: 5.2 MMOL/L
PROT SERPL-MCNC: 7.7 G/DL
PROTEIN URINE: NEGATIVE
PSA SERPL-MCNC: 5.68 NG/ML
RBC # BLD: 4.86 M/UL
RBC # FLD: 12.4 %
RED BLOOD CELLS URINE: 4 /HPF
SODIUM SERPL-SCNC: 134 MMOL/L
SPECIFIC GRAVITY URINE: 1.01
SQUAMOUS EPITHELIAL CELLS: 0 /HPF
T3RU NFR SERPL: 1 TBI
T4 SERPL-MCNC: 6.2 UG/DL
TRIGL SERPL-MCNC: 328 MG/DL
TSH SERPL-ACNC: 2.62 UIU/ML
URATE SERPL-MCNC: 6.9 MG/DL
UROBILINOGEN URINE: NORMAL
VIT B12 SERPL-MCNC: 540 PG/ML
WBC # FLD AUTO: 8.11 K/UL
WHITE BLOOD CELLS URINE: 1 /HPF

## 2021-03-23 ENCOUNTER — OUTPATIENT (OUTPATIENT)
Dept: OUTPATIENT SERVICES | Facility: HOSPITAL | Age: 71
LOS: 1 days | End: 2021-03-23
Payer: MEDICARE

## 2021-03-23 ENCOUNTER — APPOINTMENT (OUTPATIENT)
Dept: ULTRASOUND IMAGING | Facility: IMAGING CENTER | Age: 71
End: 2021-03-23
Payer: MEDICARE

## 2021-03-23 DIAGNOSIS — R74.01 ELEVATION OF LEVELS OF LIVER TRANSAMINASE LEVELS: ICD-10-CM

## 2021-03-23 PROCEDURE — 76700 US EXAM ABDOM COMPLETE: CPT

## 2021-03-23 PROCEDURE — 76700 US EXAM ABDOM COMPLETE: CPT | Mod: 26

## 2021-03-24 ENCOUNTER — NON-APPOINTMENT (OUTPATIENT)
Age: 71
End: 2021-03-24

## 2021-03-25 ENCOUNTER — NON-APPOINTMENT (OUTPATIENT)
Age: 71
End: 2021-03-25

## 2021-03-25 ENCOUNTER — APPOINTMENT (OUTPATIENT)
Dept: CARDIOLOGY | Facility: CLINIC | Age: 71
End: 2021-03-25
Payer: MEDICARE

## 2021-03-25 VITALS
SYSTOLIC BLOOD PRESSURE: 150 MMHG | OXYGEN SATURATION: 98 % | HEART RATE: 106 BPM | DIASTOLIC BLOOD PRESSURE: 80 MMHG | BODY MASS INDEX: 25.86 KG/M2 | WEIGHT: 146 LBS

## 2021-03-25 PROCEDURE — 93000 ELECTROCARDIOGRAM COMPLETE: CPT

## 2021-03-25 PROCEDURE — 99204 OFFICE O/P NEW MOD 45 MIN: CPT

## 2021-03-25 RX ORDER — ENEMA 19; 7 G/133ML; G/133ML
7-19 ENEMA RECTAL
Qty: 2 | Refills: 0 | Status: DISCONTINUED | COMMUNITY
Start: 2020-01-07 | End: 2021-03-25

## 2021-03-25 RX ORDER — ZOSTER VACCINE RECOMBINANT, ADJUVANTED 50 MCG/0.5
50 KIT INTRAMUSCULAR
Qty: 2 | Refills: 0 | Status: DISCONTINUED | COMMUNITY
Start: 2018-06-01 | End: 2021-03-25

## 2021-03-25 RX ORDER — METHYLPREDNISOLONE 4 MG/1
4 TABLET ORAL
Qty: 1 | Refills: 1 | Status: DISCONTINUED | COMMUNITY
Start: 2019-12-02 | End: 2021-03-25

## 2021-04-08 ENCOUNTER — APPOINTMENT (OUTPATIENT)
Dept: CARDIOLOGY | Facility: CLINIC | Age: 71
End: 2021-04-08
Payer: MEDICARE

## 2021-04-08 PROCEDURE — 93306 TTE W/DOPPLER COMPLETE: CPT

## 2021-04-11 NOTE — PHYSICAL EXAM
[General Appearance - Well Developed] : well developed [General Appearance - Well Nourished] : well nourished [General Appearance - In No Acute Distress] : no acute distress [Normal Conjunctiva] : the conjunctiva exhibited no abnormalities [Normal Jugular Venous V Waves Present] : normal jugular venous V waves present [Heart Sounds] : normal S1 and S2 [Murmurs] : no murmurs present [Arterial Pulses Normal] : the arterial pulses were normal [Edema] : no peripheral edema present [Respiration, Rhythm And Depth] : normal respiratory rhythm and effort [Auscultation Breath Sounds / Voice Sounds] : lungs were clear to auscultation bilaterally [Bowel Sounds] : normal bowel sounds [Abdomen Soft] : soft [Abnormal Walk] : normal gait [Cyanosis, Localized] : no localized cyanosis [Skin Turgor] : normal skin turgor [Oriented To Time, Place, And Person] : oriented to person, place, and time

## 2021-04-11 NOTE — DISCUSSION/SUMMARY
[FreeTextEntry1] : He is a 70-year-old with a history of hypothyroidism, mood disorder, hyperlipidemia and prior atrial tachycardia.\par He now presents with recurrence of his sinus tachycardia. \par I have suggested that he undergo echocardiography in order to exclude any structural or pericardial changes that may be causing his tachycardia.  We will monitor his thyroid function.\par He will reduce his overall coffee intake and increase his exercise routine.\par Follow-up with me in 8 weeks to see if his tachycardia has resolved.

## 2021-04-11 NOTE — HISTORY OF PRESENT ILLNESS
[FreeTextEntry1] : Dear Jaime,\par Thank you for referring him for cardiovascular evaluation.  \par He is a 80-year-old with a history of paroxysmal atrial tachycardia and tachycardia who was noted to have an elevated heart rate of 90.\par He was seen in your office for routine evaluation and noted to have an elevated heart rate.\par He denies any lightheadedness, dizziness or syncope.\par He does drink a fair amount of coffee.\par Prior paroxysmal atrial tachycardia and sinus tachycardia work-up in 2015 showed normal exercise capacity with no echocardiographic evidence of ischemia.\par He has a history of gastrointestinal ulcer and mood disorder.  He is currently being treated for hypothyroidism.\par He has no history of coronary artery disease or smoking.\par \par

## 2021-04-15 ENCOUNTER — APPOINTMENT (OUTPATIENT)
Dept: UROLOGY | Facility: CLINIC | Age: 71
End: 2021-04-15
Payer: MEDICARE

## 2021-04-15 PROCEDURE — 99214 OFFICE O/P EST MOD 30 MIN: CPT

## 2021-04-15 NOTE — LETTER BODY
[FreeTextEntry1] : Jaime Bello MD\par [address] [facility]\par [address]\[place] [address]\par P [phone]\par F [phone]\par \par Dear Dr. Bello,\par \par Reason for visit: Elevated PSA. BPH. \par \par This is a 70 year-old gentleman with elevated PSA. He reports previous negative prostate biopsy with Dr. Zamarripa. The patient returns today for follow-up. Since he was last seen, the patient notes mild symptoms of BPH. Patient reports he has weak uroflow, frequency, and hesitancy. He denies any hematuria or urinary incontinence. His symptoms are aggravated by hydration. He denies any alleviating factors. He has not tried any medical therapy previously. He obtained a negative prostate MRI in Jan 2020, PI-RADS 2. Patient denies any other changes in health. He denies any pain or hematuria or dysuria. The past medical history, family history and social history are unchanged. All other review of systems are negative. Patient denies any changes in medications. Medication list was reconciled.\par \par On examination, the patient is a healthy-appearing gentleman in no acute distress. He is alert and oriented follows commands. He has normal mood and affect. He is normocephalic. Oral no thrush. Neck is supple. Respirations are unlabored. His abdomen is soft and nontender. Liver is nonpalpable. Bladder is nonpalpable. No CVA tenderness. Neurologically he is grossly intact. No peripheral edema. Skin without gross abnormality. He has normal male external genitalia. Normal meatus. Bilateral testes are descended intrascrotally and normal to palpation. On rectal examination, there is normal sphincter tone. The prostate is clinically benign without focal induration or nodularity.\par \par I personally reviewed MRI prostate images with the patient today and images demonstrated a mildly enlarged prostate gland of 43 cc without evidene of suspicious prostatic lesions, PI-RADS 2.\par \par His recent CMP demonstrated normal renal functions, creatinine 1.12. His PSA was 5.68, which is stable. His urinalysis was unremarkable.\par \par Assessment: Elevated PSA, stable. BPH, minimal symptoms.\par \par I counseled the patient on the various etiology of his symptoms. I discussed the natural history of BPH and the treatment options available. I discussed the options of conservative management with fluid in dietary restrictions, herbal therapy, medical therapy, and minimally invasive procedures. Risk and benefits were discussed. I answered his questions. Since he has minimal symptoms, the patient defers medical therapy. In terms of his elevated PSA, I reassured the patient as his PSA remains stable. I discussed with the patient that his prostate MRI was negative, PI-RADS 2. Risks and alternatives were discussed. I answered the patient questions. The patient will follow-up in 1 year and will contact me with any questions or concerns. Thank you for the opportunity to participate in the care of Mr. VELÁSQUEZ. I will keep you updated on his progress.\par \par Plan: Follow-up in 1 year.\par \par I spent 30-minutes time today on all issues related to this patient on today date of service including non face to face time.

## 2021-04-15 NOTE — ADDENDUM
[FreeTextEntry1] : Entered by Jesus Brito, acting as scribe for Dr. Javid Bourne.\par \par The documentation recorded by the scribe accurately reflects the service I personally performed and the decisions made by me.\par

## 2021-05-26 ENCOUNTER — APPOINTMENT (OUTPATIENT)
Dept: CARDIOLOGY | Facility: CLINIC | Age: 71
End: 2021-05-26
Payer: MEDICARE

## 2021-05-26 ENCOUNTER — NON-APPOINTMENT (OUTPATIENT)
Age: 71
End: 2021-05-26

## 2021-05-26 VITALS
HEART RATE: 86 BPM | SYSTOLIC BLOOD PRESSURE: 144 MMHG | WEIGHT: 146 LBS | DIASTOLIC BLOOD PRESSURE: 80 MMHG | BODY MASS INDEX: 25.86 KG/M2 | OXYGEN SATURATION: 97 %

## 2021-05-26 DIAGNOSIS — R03.0 ELEVATED BLOOD-PRESSURE READING, W/OUT DIAGNOSIS OF HYPERTENSION: ICD-10-CM

## 2021-05-26 DIAGNOSIS — R00.2 PALPITATIONS: ICD-10-CM

## 2021-05-26 PROCEDURE — 99214 OFFICE O/P EST MOD 30 MIN: CPT

## 2021-05-26 PROCEDURE — 93000 ELECTROCARDIOGRAM COMPLETE: CPT

## 2021-05-26 NOTE — DISCUSSION/SUMMARY
[FreeTextEntry1] : He is a 70-year-old with a history of hypothyroidism, mood disorder, hyperlipidemia and prior atrial tachycardia.\par Now in SR @ 72 bpm.\par No symptoms now.\par BP is borderline.\par Encouraged routine aerobic exercise. 20 to 40 min. qod. No HR target.\par Annual followup and echo.

## 2021-05-26 NOTE — HISTORY OF PRESENT ILLNESS
[FreeTextEntry1] : Feels well.\par Working. Not exercising.\par No chest pain or palpitations.\par Reviewed echo: Moderate AI unchanged from 2015.\par \par \par \par Prior:\par Dear Jaime,\par Thank you for referring him for cardiovascular evaluation.  \par He is a 70-year-old with a history of paroxysmal atrial tachycardia and tachycardia who was noted to have an elevated heart rate of 90.\par He was seen in your office for routine evaluation and noted to have an elevated heart rate.\par He denies any lightheadedness, dizziness or syncope.\par He does drink a fair amount of coffee.\par Prior paroxysmal atrial tachycardia and sinus tachycardia work-up in 2015 showed normal exercise capacity with no echocardiographic evidence of ischemia.\par He has a history of gastrointestinal ulcer and mood disorder.  He is currently being treated for hypothyroidism.\par He has no history of coronary artery disease or smoking.\par \par

## 2021-07-05 ENCOUNTER — RX RENEWAL (OUTPATIENT)
Age: 71
End: 2021-07-05

## 2021-12-14 ENCOUNTER — APPOINTMENT (OUTPATIENT)
Dept: INTERNAL MEDICINE | Facility: CLINIC | Age: 71
End: 2021-12-14

## 2021-12-30 ENCOUNTER — RX RENEWAL (OUTPATIENT)
Age: 71
End: 2021-12-30

## 2022-03-28 ENCOUNTER — LABORATORY RESULT (OUTPATIENT)
Age: 72
End: 2022-03-28

## 2022-03-28 ENCOUNTER — APPOINTMENT (OUTPATIENT)
Dept: INTERNAL MEDICINE | Facility: CLINIC | Age: 72
End: 2022-03-28
Payer: MEDICARE

## 2022-03-28 ENCOUNTER — NON-APPOINTMENT (OUTPATIENT)
Age: 72
End: 2022-03-28

## 2022-03-28 VITALS — DIASTOLIC BLOOD PRESSURE: 80 MMHG | SYSTOLIC BLOOD PRESSURE: 140 MMHG

## 2022-03-28 VITALS — HEIGHT: 63 IN | BODY MASS INDEX: 26.22 KG/M2 | WEIGHT: 148 LBS

## 2022-03-28 DIAGNOSIS — M50.20 OTHER CERVICAL DISC DISPLACEMENT, UNSPECIFIED CERVICAL REGION: ICD-10-CM

## 2022-03-28 PROCEDURE — 99213 OFFICE O/P EST LOW 20 MIN: CPT | Mod: 25

## 2022-03-28 PROCEDURE — G0439: CPT

## 2022-03-28 PROCEDURE — 93000 ELECTROCARDIOGRAM COMPLETE: CPT

## 2022-03-28 PROCEDURE — 36415 COLL VENOUS BLD VENIPUNCTURE: CPT

## 2022-03-28 NOTE — PHYSICAL EXAM
[Normal Sphincter Tone] : normal sphincter tone [No Mass] : no mass [Testes Mass (___cm)] : there were no testicular masses [Prostate Enlargement] : the prostate was not enlarged [No Prostate Nodules] : no prostate nodules [Prostate Tenderness] : the prostate was not tender [Normal] : normal gait, coordination grossly intact, no focal deficits and deep tendon reflexes were 2+ and symmetric

## 2022-03-28 NOTE — HISTORY OF PRESENT ILLNESS
[de-identified] : This is a 71 -year-old patient with a history of elevated PSA level, herniated cervical disc, hypercholesterolemia, thyroid disorder, mood disorder who is here today for his annual well exam

## 2022-03-28 NOTE — HEALTH RISK ASSESSMENT
[No] : No [No falls in past year] : Patient reported no falls in the past year [0] : 2) Feeling down, depressed, or hopeless: Not at all (0) [PHQ-2 Negative - No further assessment needed] : PHQ-2 Negative - No further assessment needed [[id number]] : 0 [Fully functional (bathing, dressing, toileting, transferring, walking, feeding)] : Fully functional (bathing, dressing, toileting, transferring, walking, feeding) [Fully functional (using the telephone, shopping, preparing meals, housekeeping, doing laundry, using] : Fully functional and needs no help or supervision to perform IADLs (using the telephone, shopping, preparing meals, housekeeping, doing laundry, using transportation, managing medications and managing finances)

## 2022-03-28 NOTE — ASSESSMENT
[FreeTextEntry1] : Problems\par Elevated PSA\par Mood disorder\par Herniated cervical disc\par Hypercholesterolemia hypothyroidism\par Assessment\par His blood pressure was well controlled his physical examination was normal except for obesity.  And bloods were obtained\par \par The patient was referred to cardiology for evaluation of his tachycardia and mitral insufficiency

## 2022-03-29 LAB
25(OH)D3 SERPL-MCNC: 31.2 NG/ML
ALBUMIN SERPL ELPH-MCNC: 4.6 G/DL
ALP BLD-CCNC: 81 U/L
ALT SERPL-CCNC: 24 U/L
ANION GAP SERPL CALC-SCNC: 12 MMOL/L
APPEARANCE: CLEAR
AST SERPL-CCNC: 23 U/L
BACTERIA: NEGATIVE
BASOPHILS # BLD AUTO: 0.05 K/UL
BASOPHILS NFR BLD AUTO: 0.7 %
BILIRUB SERPL-MCNC: 0.2 MG/DL
BILIRUBIN URINE: NEGATIVE
BLOOD URINE: NEGATIVE
BUN SERPL-MCNC: 12 MG/DL
CALCIUM SERPL-MCNC: 9.4 MG/DL
CHLORIDE SERPL-SCNC: 100 MMOL/L
CHOLEST SERPL-MCNC: 197 MG/DL
CO2 SERPL-SCNC: 26 MMOL/L
COLOR: COLORLESS
CREAT SERPL-MCNC: 1.22 MG/DL
EGFR: 64 ML/MIN/1.73M2
EOSINOPHIL # BLD AUTO: 0.07 K/UL
EOSINOPHIL NFR BLD AUTO: 1 %
ESTIMATED AVERAGE GLUCOSE: 123 MG/DL
FERRITIN SERPL-MCNC: 353 NG/ML
FOLATE SERPL-MCNC: 7.3 NG/ML
GLUCOSE QUALITATIVE U: NORMAL
GLUCOSE SERPL-MCNC: 154 MG/DL
HBA1C MFR BLD HPLC: 5.9 %
HCT VFR BLD CALC: 43.4 %
HDLC SERPL-MCNC: 48 MG/DL
HGB BLD-MCNC: 14.4 G/DL
HYALINE CASTS: 1 /LPF
IMM GRANULOCYTES NFR BLD AUTO: 0.4 %
KETONES URINE: NEGATIVE
LDLC SERPL CALC-MCNC: 88 MG/DL
LEUKOCYTE ESTERASE URINE: NEGATIVE
LYMPHOCYTES # BLD AUTO: 1.15 K/UL
LYMPHOCYTES NFR BLD AUTO: 16.8 %
MAN DIFF?: NORMAL
MCHC RBC-ENTMCNC: 32 PG
MCHC RBC-ENTMCNC: 33.2 GM/DL
MCV RBC AUTO: 96.4 FL
MICROSCOPIC-UA: NORMAL
MONOCYTES # BLD AUTO: 0.69 K/UL
MONOCYTES NFR BLD AUTO: 10.1 %
NEUTROPHILS # BLD AUTO: 4.85 K/UL
NEUTROPHILS NFR BLD AUTO: 71 %
NITRITE URINE: NEGATIVE
NONHDLC SERPL-MCNC: 149 MG/DL
PH URINE: 6.5
PLATELET # BLD AUTO: 156 K/UL
POTASSIUM SERPL-SCNC: 4.5 MMOL/L
PROT SERPL-MCNC: 6.6 G/DL
PROTEIN URINE: NEGATIVE
PSA SERPL-MCNC: 4.63 NG/ML
RBC # BLD: 4.5 M/UL
RBC # FLD: 11.8 %
RED BLOOD CELLS URINE: 0 /HPF
SODIUM SERPL-SCNC: 138 MMOL/L
SPECIFIC GRAVITY URINE: 1
SQUAMOUS EPITHELIAL CELLS: 0 /HPF
T3RU NFR SERPL: 1.1 TBI
T4 SERPL-MCNC: 6.3 UG/DL
TRIGL SERPL-MCNC: 307 MG/DL
TSH SERPL-ACNC: 2.13 UIU/ML
URATE SERPL-MCNC: 6.1 MG/DL
UROBILINOGEN URINE: NORMAL
VIT B12 SERPL-MCNC: 466 PG/ML
WBC # FLD AUTO: 6.84 K/UL
WHITE BLOOD CELLS URINE: 1 /HPF

## 2022-07-29 ENCOUNTER — RX RENEWAL (OUTPATIENT)
Age: 72
End: 2022-07-29

## 2022-08-30 ENCOUNTER — APPOINTMENT (OUTPATIENT)
Dept: PSYCHIATRY | Facility: CLINIC | Age: 72
End: 2022-08-30

## 2022-08-30 PROCEDURE — 99205 OFFICE O/P NEW HI 60 MIN: CPT

## 2022-09-29 ENCOUNTER — APPOINTMENT (OUTPATIENT)
Dept: UROLOGY | Facility: CLINIC | Age: 72
End: 2022-09-29

## 2022-09-29 ENCOUNTER — LABORATORY RESULT (OUTPATIENT)
Age: 72
End: 2022-09-29

## 2022-09-29 ENCOUNTER — APPOINTMENT (OUTPATIENT)
Dept: INTERNAL MEDICINE | Facility: CLINIC | Age: 72
End: 2022-09-29

## 2022-09-29 VITALS — BODY MASS INDEX: 26.58 KG/M2 | HEIGHT: 63 IN | WEIGHT: 150 LBS

## 2022-09-29 VITALS — DIASTOLIC BLOOD PRESSURE: 70 MMHG | SYSTOLIC BLOOD PRESSURE: 130 MMHG

## 2022-09-29 DIAGNOSIS — R97.20 ELEVATED PROSTATE, SPECIFIC ANTIGEN [PSA]: ICD-10-CM

## 2022-09-29 PROCEDURE — 99214 OFFICE O/P EST MOD 30 MIN: CPT

## 2022-09-29 PROCEDURE — 36415 COLL VENOUS BLD VENIPUNCTURE: CPT | Mod: 59

## 2022-09-29 PROCEDURE — 99214 OFFICE O/P EST MOD 30 MIN: CPT | Mod: 25

## 2022-09-29 NOTE — ASSESSMENT
[FreeTextEntry1] : Problems\par Mild to moderate aortic insufficiency-the patient's last echocardiogram was over a year ago.  In addition he is complaining of sinus tachycardia.  I referred him to cardiology for an echo and stress test\par Hypercholesterolemia-he continues on his atorvastatin and bloods were drawn to check his cholesterol and liver enzymes\par Hypothyroidism-he continues on Synthroid 50mcg daily and his TSH level was drawn today\par Mood disorder-he continues on his amitriptyline 100 mg daily and Abilify 2 mg a day\par

## 2022-09-29 NOTE — HISTORY OF PRESENT ILLNESS
[de-identified] : This is a 71-year-old gentleman with a history of mild to moderate aortic insufficiency, hypercholesterolemia, elevated PSA level, mood disorder who is here for follow-up visit

## 2022-09-29 NOTE — HISTORY OF PRESENT ILLNESS
[de-identified] : This is a 71-year-old gentleman with a history of mild to moderate aortic insufficiency, hypercholesterolemia, elevated PSA level, mood disorder who is here for follow-up visit

## 2022-09-30 ENCOUNTER — TRANSCRIPTION ENCOUNTER (OUTPATIENT)
Age: 72
End: 2022-09-30

## 2022-09-30 LAB
ALBUMIN SERPL ELPH-MCNC: 4.7 G/DL
ALP BLD-CCNC: 58 U/L
ALT SERPL-CCNC: 41 U/L
ANION GAP SERPL CALC-SCNC: 14 MMOL/L
APPEARANCE: CLEAR
AST SERPL-CCNC: 31 U/L
BACTERIA: NEGATIVE
BASOPHILS # BLD AUTO: 0.05 K/UL
BASOPHILS NFR BLD AUTO: 0.6 %
BILIRUB SERPL-MCNC: 0.6 MG/DL
BILIRUBIN URINE: NEGATIVE
BLOOD URINE: NEGATIVE
BUN SERPL-MCNC: 13 MG/DL
CALCIUM SERPL-MCNC: 9.4 MG/DL
CHLORIDE SERPL-SCNC: 95 MMOL/L
CHOLEST SERPL-MCNC: 176 MG/DL
CO2 SERPL-SCNC: 25 MMOL/L
COLOR: NORMAL
CREAT SERPL-MCNC: 1.06 MG/DL
EGFR: 76 ML/MIN/1.73M2
EOSINOPHIL # BLD AUTO: 0.04 K/UL
EOSINOPHIL NFR BLD AUTO: 0.5 %
ESTIMATED AVERAGE GLUCOSE: 123 MG/DL
FERRITIN SERPL-MCNC: 545 NG/ML
FOLATE SERPL-MCNC: 10.2 NG/ML
GLUCOSE QUALITATIVE U: NEGATIVE
GLUCOSE SERPL-MCNC: 127 MG/DL
HBA1C MFR BLD HPLC: 5.9 %
HCT VFR BLD CALC: 41.5 %
HDLC SERPL-MCNC: 37 MG/DL
HGB BLD-MCNC: 14.1 G/DL
HYALINE CASTS: 0 /LPF
IMM GRANULOCYTES NFR BLD AUTO: 1 %
KETONES URINE: NEGATIVE
LDLC SERPL CALC-MCNC: NORMAL MG/DL
LEUKOCYTE ESTERASE URINE: NEGATIVE
LYMPHOCYTES # BLD AUTO: 0.89 K/UL
LYMPHOCYTES NFR BLD AUTO: 10.7 %
MAN DIFF?: NORMAL
MCHC RBC-ENTMCNC: 32.8 PG
MCHC RBC-ENTMCNC: 34 GM/DL
MCV RBC AUTO: 96.5 FL
MICROSCOPIC-UA: NORMAL
MONOCYTES # BLD AUTO: 0.71 K/UL
MONOCYTES NFR BLD AUTO: 8.5 %
NEUTROPHILS # BLD AUTO: 6.58 K/UL
NEUTROPHILS NFR BLD AUTO: 78.7 %
NITRITE URINE: NEGATIVE
NONHDLC SERPL-MCNC: 138 MG/DL
PH URINE: 6.5
PLATELET # BLD AUTO: 152 K/UL
POTASSIUM SERPL-SCNC: 4.2 MMOL/L
PROT SERPL-MCNC: 6.9 G/DL
PROTEIN URINE: NEGATIVE
RBC # BLD: 4.3 M/UL
RBC # FLD: 12.3 %
RED BLOOD CELLS URINE: 0 /HPF
SODIUM SERPL-SCNC: 134 MMOL/L
SPECIFIC GRAVITY URINE: 1.01
SQUAMOUS EPITHELIAL CELLS: 0 /HPF
T3RU NFR SERPL: 1 TBI
T4 SERPL-MCNC: 6.7 UG/DL
TRIGL SERPL-MCNC: 448 MG/DL
TSH SERPL-ACNC: 3.58 UIU/ML
URATE SERPL-MCNC: 6.6 MG/DL
UROBILINOGEN URINE: NORMAL
VIT B12 SERPL-MCNC: 514 PG/ML
WBC # FLD AUTO: 8.35 K/UL
WHITE BLOOD CELLS URINE: 0 /HPF

## 2022-10-12 NOTE — HISTORY OF PRESENT ILLNESS
[FreeTextEntry1] : Follow-up of a PSA.  Recent PSA is 4.63.  Patient had previous PI-RADS 2 MRI in 2020.\par \par Patient has BPH.  However has minimal bother.  Declines medication.  Follow-up 1 year.\par \par Please refer to URO Consult note

## 2022-10-12 NOTE — LETTER BODY
[FreeTextEntry1] : Jaime Bello MD\par [address]\par [address]\[place] [address]\par P [phone]\par F [phone]\par \par Dear Dr. Bello,\par \par Reason for visit: Elevated PSA. BPH. \par \par This is a 71 year-old gentleman with elevated PSA. He reports previous negative prostate biopsy with Dr. Zamarripa. He obtained a negative prostate MRI in Jan 2020, PI-RADS 2. The patient returns today for follow-up. Since he was last seen, the patient continues to notemild symptoms of BPH. Patient reports he has weak uroflow, frequency, and hesitancy. He denies any hematuria or urinary incontinence. His symptoms are aggravated by hydration. He denies any alleviating factors. He has not tried any medical therapy previously. Patient denies any other changes in health. He denies any pain or hematuria or dysuria. The past medical history, family history and social history are unchanged. All other review of systems are negative. Patient denies any changes in medications. Medication list was reconciled.\par \par On examination, the patient is a healthy-appearing gentleman in no acute distress. He is alert and oriented follows commands. He has normal mood and affect. He is normocephalic. Oral no thrush. Neck is supple. Respirations are unlabored. His abdomen is soft and nontender. Liver is nonpalpable. Bladder is nonpalpable. No CVA tenderness. Neurologically he is grossly intact. No peripheral edema. Skin without gross abnormality. He has normal male external genitalia. Normal meatus. Bilateral testes are descended intrascrotally and normal to palpation. On rectal examination, there is normal sphincter tone. The prostate is clinically benign without focal induration or nodularity.\par \par His PSA was 4.63, which is elevated but improved. His previous PSA was 5.68.\par \par Assessment: Elevated PSA, stable. BPH, minimal symptoms.\par \par I counseled the patient on the various etiology of his symptoms. I discussed the natural history of BPH and the treatment options available. I discussed the options of conservative management with fluid in dietary restrictions, herbal therapy, medical therapy, and minimally invasive procedures. Risk and benefits were discussed. I answered his questions. Since he has minimal symptoms, the patient defers medical therapy. In terms of his elevated PSA, I reassured the patient as his PSA remains stable. I discussed with the patient that his prostate MRI was negative, PI-RADS 2. Risks and alternatives were discussed. I answered the patient questions. The patient will follow-up in 1 year and will contact me with any questions or concerns. Thank you for the opportunity to participate in the care of Mr. VELÁSQUEZ. I will keep you updated on his progress.\par \par Plan: Follow-up in 1 year.

## 2022-10-12 NOTE — ADDENDUM
[FreeTextEntry1] : Entered by QUIQUE CHÁVEZ, acting as scribe for Dr. Javid Bourne.\par The documentation recorded by the scribe accurately reflects the service I personally performed and the decisions made by me.

## 2022-10-31 ENCOUNTER — APPOINTMENT (OUTPATIENT)
Dept: PSYCHIATRY | Facility: CLINIC | Age: 72
End: 2022-10-31

## 2022-10-31 PROCEDURE — 99214 OFFICE O/P EST MOD 30 MIN: CPT

## 2022-10-31 NOTE — CURRENT PSYCHIATRIC SYMPTOMS
[de-identified] : Denied [de-identified] : Denied [de-identified] : Denied [de-identified] : Denied [de-identified] : None [de-identified] : None [de-identified] : None

## 2022-10-31 NOTE — SOCIAL HISTORY
[FreeTextEntry1] : Patient grew up in [facility].  He attended [employer] graduating 1969.  High school was good he was a good student he then went to [employer] graduating in 1972 with an associates degree in electrical engineering.  In college she partied a lot with member of .  He then went to work for an [employer] company for 2 years and then for [employer] for 39 years.  He retired in 2011.  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  Patient was  in 2003.  At his first marriage.  His stepson is age 40.

## 2022-10-31 NOTE — DISCUSSION/SUMMARY
[FreeTextEntry1] : 71-year-old male with anxiety depression situational stressors.  Plan continue Elavil 100 mg Abilify 2 mg clonazepam 0.5 mg as needed.  Follow-up in 3 months.

## 2022-10-31 NOTE — PAST MEDICAL HISTORY
[FreeTextEntry1] : Patient states he was first depressed at age 17.  He has been in and out of therapy his entire life.  He was first started on antidepressant medication somewhere in the 1990s.  Remembers being on Prozac Lexapro on multiple other medications.  He has been stable on Elavil and Abilify for the past 3 years.  Patient is never been in a psychiatric hospital.

## 2022-10-31 NOTE — FAMILY HISTORY
[FreeTextEntry1] : Patient born in [facility] mother had Alzheimer's disease she was depressed paranoid Father  of heart disease he has a sister and brother who are twins age 68.  No other psychiatric alcohol or drug history in the family.  No abuse history growing up.

## 2022-10-31 NOTE — HISTORY OF PRESENT ILLNESS
[FreeTextEntry1] : Patient's moods have been stable.  Under a little bit of stress trying to program mother as well and sell her house.  Sister currently living there.  Labs reviewed with patient high lipids glucose.  Consistently low vitamin D.  Not taking any supplements.  Anxiety under good control with medication. [de-identified] : Patient is a 71-year-old male, , retired from [employer].  Patient lives at home with wife age 71 who is also retired.  Patient states he needs a new psychiatrist.  He has been seeing a psychiatrist who is paying cash to.  He feels that he can no longer.  Afford to do that.  Patient currently stable on Elavil 100 mg Abilify 2 mg and clonazepam 0.5 mg as needed.  He states he has been depressed his whole life.  Patient has several minor stressors in life right now.  Patient states his wife had owned a property in [place].  Her son took over and bought half that estate.  The patient and his wife own accord over the state and another family member owns a quarter the estate.  His wife son lives there refuses to go out and does not pay anything for being there.  The patient feels he needs them to sell the house so he can have some extra income to pay off his mortgage and live.  Patient also has a house in [place] that he grew up in that his mother had that his sister is currently living in.  He needs to have that sold so he can have the extra income.  Other than that he has no real stressors.  Since retiring the patient spends his days doing work around the house.  During the day still smokes cigars and drinks about 6-7 beers a day.  His diet is good he does not exercise.\par \par Patient gets up at about 9:00 in the morning.  In the morning he will have something eat do some work around the house in the afternoon his home in the evening he will have dinner watch television and go to bed anywhere from 11 PM to 1 AM.  He has no problem falling asleep awakens 1 time to go the bathroom.  Appetite normal height 5 feet 3 inches weight 145 pounds.

## 2022-11-02 ENCOUNTER — APPOINTMENT (OUTPATIENT)
Dept: CARDIOLOGY | Facility: CLINIC | Age: 72
End: 2022-11-02

## 2022-11-02 PROCEDURE — 93306 TTE W/DOPPLER COMPLETE: CPT

## 2022-11-10 ENCOUNTER — TRANSCRIPTION ENCOUNTER (OUTPATIENT)
Age: 72
End: 2022-11-10

## 2022-11-10 DIAGNOSIS — U07.1 COVID-19: ICD-10-CM

## 2022-12-16 PROBLEM — U07.1 COVID-19: Status: ACTIVE | Noted: 2022-12-16

## 2023-01-03 ENCOUNTER — RX RENEWAL (OUTPATIENT)
Age: 73
End: 2023-01-03

## 2023-02-13 ENCOUNTER — APPOINTMENT (OUTPATIENT)
Dept: PSYCHIATRY | Facility: CLINIC | Age: 73
End: 2023-02-13
Payer: MEDICARE

## 2023-02-13 DIAGNOSIS — F43.20 ADJUSTMENT DISORDER, UNSPECIFIED: ICD-10-CM

## 2023-02-13 PROCEDURE — 99214 OFFICE O/P EST MOD 30 MIN: CPT

## 2023-02-13 NOTE — CURRENT PSYCHIATRIC SYMPTOMS
[de-identified] : Denied [de-identified] : Denied [de-identified] : Denied [de-identified] : Denied [de-identified] : None [de-identified] : None [de-identified] : None

## 2023-02-13 NOTE — SOCIAL HISTORY
[FreeTextEntry1] : Patient grew up in [facility].  He attended [employer] school graduating 1969.  High school was good he was a good student he then went to [employer] graduating in 1972 with an associates degree in electrical engineering.  In college she partied a lot with member of .  He then went to work for an [employer] company for 2 years and then for [employer] for 39 years.  He retired in 2011.  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  Patient was  in 2003.  At his first marriage.  His stepson is age 40.

## 2023-02-13 NOTE — FAMILY HISTORY
[FreeTextEntry1] : Patient born in [facility] mother had Alzheimer's disease she was depressed paranoid Father  of heart disease he has a sister and brother who are twins age 68.  No other psychiatric alcohol or drug history in the family.  No abuse history growing up.

## 2023-02-13 NOTE — DISCUSSION/SUMMARY
[FreeTextEntry1] : 72-year-old male with anxiety depression situational stressors.  Plan continue Elavil 100 mg Abilify 2 mg clonazepam 0.5 mg as needed.  Follow-up in 6 months.

## 2023-02-13 NOTE — HISTORY OF PRESENT ILLNESS
[FreeTextEntry1] : Patient has been doing well.  Sister leaving the house selling the house.  Upset with wife because she sold her house and gave the proceeds of the sale to her son.  Tends to ruminate a little bit about certain issues.  No new medications or medical problems other than finasteride for prostatic hypertrophy. [de-identified] : Patient is a 71-year-old male, , retired from [employer].  Patient lives at home with wife age 71 who is also retired.  Patient states he needs a new psychiatrist.  He has been seeing a psychiatrist who is paying cash to.  He feels that he can no longer.  Afford to do that.  Patient currently stable on Elavil 100 mg Abilify 2 mg and clonazepam 0.5 mg as needed.  He states he has been depressed his whole life.  Patient has several minor stressors in life right now.  Patient states his wife had owned a property in [place].  Her son took over and bought half that estate.  The patient and his wife own accord over the state and another family member owns a quarter the estate.  His wife son lives there refuses to go out and does not pay anything for being there.  The patient feels he needs them to sell the house so he can have some extra income to pay off his mortgage and live.  Patient also has a house in [place] that he grew up in that his mother had that his sister is currently living in.  He needs to have that sold so he can have the extra income.  Other than that he has no real stressors.  Since retiring the patient spends his days doing work around the house.  During the day still smokes cigars and drinks about 6-7 beers a day.  His diet is good he does not exercise.\par \par Patient gets up at about 9:00 in the morning.  In the morning he will have something eat do some work around the house in the afternoon his home in the evening he will have dinner watch television and go to bed anywhere from 11 PM to 1 AM.  He has no problem falling asleep awakens 1 time to go the bathroom.  Appetite normal height 5 feet 3 inches weight 145 pounds.

## 2023-03-30 ENCOUNTER — LABORATORY RESULT (OUTPATIENT)
Age: 73
End: 2023-03-30

## 2023-03-30 ENCOUNTER — NON-APPOINTMENT (OUTPATIENT)
Age: 73
End: 2023-03-30

## 2023-03-30 ENCOUNTER — APPOINTMENT (OUTPATIENT)
Dept: INTERNAL MEDICINE | Facility: CLINIC | Age: 73
End: 2023-03-30
Payer: MEDICARE

## 2023-03-30 VITALS — BODY MASS INDEX: 25.87 KG/M2 | WEIGHT: 146 LBS | HEIGHT: 63 IN

## 2023-03-30 VITALS — SYSTOLIC BLOOD PRESSURE: 130 MMHG | DIASTOLIC BLOOD PRESSURE: 70 MMHG

## 2023-03-30 DIAGNOSIS — I35.1 NONRHEUMATIC AORTIC (VALVE) INSUFFICIENCY: ICD-10-CM

## 2023-03-30 PROCEDURE — 36415 COLL VENOUS BLD VENIPUNCTURE: CPT

## 2023-03-30 PROCEDURE — 93000 ELECTROCARDIOGRAM COMPLETE: CPT | Mod: 59

## 2023-03-30 PROCEDURE — G0439: CPT

## 2023-03-30 NOTE — HISTORY OF PRESENT ILLNESS
[de-identified] : This is a 72 -year-old patient with a history of elevated PSA level, herniated cervical disc, hypercholesterolemia, thyroid disorder, mood disorder who is here today for his annual well exam

## 2023-03-30 NOTE — ASSESSMENT
[FreeTextEntry1] : Problems\par Elevated PSA\par Mood disorder\par Herniated cervical disc\par Hypercholesterolemia hypothyroidism\par Assessment\par His blood pressure was well controlled his physical examination was normal except for obesity.  And bloods were obtained.  The patient was referred to cardiology and urology\par \par

## 2023-03-30 NOTE — HEALTH RISK ASSESSMENT
[No] : No [No falls in past year] : Patient reported no falls in the past year [0] : 2) Feeling down, depressed, or hopeless: Not at all (0) [PHQ-2 Negative - No further assessment needed] : PHQ-2 Negative - No further assessment needed [Fully functional (bathing, dressing, toileting, transferring, walking, feeding)] : Fully functional (bathing, dressing, toileting, transferring, walking, feeding) [Fully functional (using the telephone, shopping, preparing meals, housekeeping, doing laundry, using] : Fully functional and needs no help or supervision to perform IADLs (using the telephone, shopping, preparing meals, housekeeping, doing laundry, using transportation, managing medications and managing finances) [Never] : Never [MUQ1Debpj] : 0

## 2023-03-31 LAB
ALBUMIN SERPL ELPH-MCNC: 4.8 G/DL
ALP BLD-CCNC: 62 U/L
ALT SERPL-CCNC: 33 U/L
ANION GAP SERPL CALC-SCNC: 14 MMOL/L
APPEARANCE: CLEAR
AST SERPL-CCNC: 29 U/L
BACTERIA: NEGATIVE
BASOPHILS # BLD AUTO: 0.05 K/UL
BASOPHILS NFR BLD AUTO: 0.6 %
BILIRUB SERPL-MCNC: 0.5 MG/DL
BILIRUBIN URINE: NEGATIVE
BLOOD URINE: NEGATIVE
BUN SERPL-MCNC: 15 MG/DL
CALCIUM SERPL-MCNC: 9.9 MG/DL
CHLORIDE SERPL-SCNC: 92 MMOL/L
CHOLEST SERPL-MCNC: 197 MG/DL
CO2 SERPL-SCNC: 27 MMOL/L
COLOR: YELLOW
CREAT SERPL-MCNC: 1.09 MG/DL
EGFR: 73 ML/MIN/1.73M2
EOSINOPHIL # BLD AUTO: 0.07 K/UL
EOSINOPHIL NFR BLD AUTO: 0.9 %
ESTIMATED AVERAGE GLUCOSE: 131 MG/DL
FOLATE SERPL-MCNC: 5.3 NG/ML
GLUCOSE QUALITATIVE U: NEGATIVE
GLUCOSE SERPL-MCNC: 141 MG/DL
HBA1C MFR BLD HPLC: 6.2 %
HCT VFR BLD CALC: 44.6 %
HDLC SERPL-MCNC: 50 MG/DL
HGB BLD-MCNC: 15 G/DL
HYALINE CASTS: 3 /LPF
IMM GRANULOCYTES NFR BLD AUTO: 0.6 %
KETONES URINE: NEGATIVE
LDLC SERPL CALC-MCNC: 96 MG/DL
LEUKOCYTE ESTERASE URINE: NEGATIVE
LYMPHOCYTES # BLD AUTO: 0.89 K/UL
LYMPHOCYTES NFR BLD AUTO: 11.6 %
MAN DIFF?: NORMAL
MCHC RBC-ENTMCNC: 32 PG
MCHC RBC-ENTMCNC: 33.6 GM/DL
MCV RBC AUTO: 95.1 FL
MICROSCOPIC-UA: NORMAL
MONOCYTES # BLD AUTO: 0.63 K/UL
MONOCYTES NFR BLD AUTO: 8.2 %
NEUTROPHILS # BLD AUTO: 6.01 K/UL
NEUTROPHILS NFR BLD AUTO: 78.1 %
NITRITE URINE: NEGATIVE
NONHDLC SERPL-MCNC: 148 MG/DL
PH URINE: 6.5
PLATELET # BLD AUTO: 180 K/UL
POTASSIUM SERPL-SCNC: 5 MMOL/L
PROT SERPL-MCNC: 7.5 G/DL
PROTEIN URINE: NORMAL
PSA SERPL-MCNC: 2.24 NG/ML
RBC # BLD: 4.69 M/UL
RBC # FLD: 12.2 %
RED BLOOD CELLS URINE: 1 /HPF
SODIUM SERPL-SCNC: 133 MMOL/L
SPECIFIC GRAVITY URINE: 1.02
SQUAMOUS EPITHELIAL CELLS: 0 /HPF
T3RU NFR SERPL: 0.9 TBI
T4 SERPL-MCNC: 7.8 UG/DL
TRIGL SERPL-MCNC: 257 MG/DL
TSH SERPL-ACNC: 3.12 UIU/ML
URATE SERPL-MCNC: 7 MG/DL
UROBILINOGEN URINE: NORMAL
VIT B12 SERPL-MCNC: 445 PG/ML
WBC # FLD AUTO: 7.7 K/UL
WHITE BLOOD CELLS URINE: 1 /HPF

## 2023-04-04 LAB — SODIUM SERPL-SCNC: 131 MMOL/L

## 2023-04-06 LAB
OSMOLALITY SERPL: 287 MOSMOL/KG
OSMOLALITY UR: 524 MOSM/KG
SODIUM ?TM SUB UR QN: 25 MMOL/L
URATE SERPL-MCNC: 6.4 MG/DL

## 2023-04-13 LAB — VASOPRESSIN SERPL-MCNC: <0.8 PG/ML

## 2023-05-23 ENCOUNTER — APPOINTMENT (OUTPATIENT)
Dept: NEPHROLOGY | Facility: CLINIC | Age: 73
End: 2023-05-23
Payer: MEDICARE

## 2023-05-23 VITALS
DIASTOLIC BLOOD PRESSURE: 88 MMHG | HEIGHT: 63 IN | OXYGEN SATURATION: 97 % | SYSTOLIC BLOOD PRESSURE: 178 MMHG | WEIGHT: 153.22 LBS | HEART RATE: 109 BPM | TEMPERATURE: 97.3 F | BODY MASS INDEX: 27.15 KG/M2

## 2023-05-23 PROCEDURE — 99204 OFFICE O/P NEW MOD 45 MIN: CPT

## 2023-05-23 NOTE — REVIEW OF SYSTEMS
[Feeling Poorly] : not feeling poorly [Feeling Tired] : not feeling tired [Nosebleeds] : no nosebleeds [Chest Pain] : no chest pain [Lower Ext Edema] : no extremity edema [Shortness Of Breath] : no shortness of breath [Wheezing] : no wheezing [Abdominal Pain] : no abdominal pain [As Noted in HPI] : as noted in HPI [Dizziness] : no dizziness [Fainting] : no fainting [Anxiety] : no anxiety [Depression] : no depression [Easy Bleeding] : no tendency for easy bleeding [Easy Bruising] : no tendency for easy bruising

## 2023-05-23 NOTE — PHYSICAL EXAM
[General Appearance - Alert] : alert [Sclera] : the sclera and conjunctiva were normal [Hearing Threshold Finger Rub Not Craig] : hearing was normal [Neck Cervical Mass (___cm)] : no neck mass was observed [Jugular Venous Distention Increased] : there was no jugular-venous distention [Exaggerated Use Of Accessory Muscles For Inspiration] : no accessory muscle use [Auscultation Breath Sounds / Voice Sounds] : lungs were clear to auscultation bilaterally [Heart Sounds] : normal S1 and S2 [Abdomen Soft] : soft [Cervical Lymph Nodes Enlarged Posterior Bilaterally] : posterior cervical [Cervical Lymph Nodes Enlarged Anterior Bilaterally] : anterior cervical [Supraclavicular Lymph Nodes Enlarged Bilaterally] : supraclavicular [No CVA Tenderness] : no ~M costovertebral angle tenderness [Abnormal Walk] : normal gait [Oriented To Time, Place, And Person] : oriented to person, place, and time [Impaired Insight] : insight and judgment were intact

## 2023-05-23 NOTE — ASSESSMENT
[FreeTextEntry1] : 73 y/o man here for evaluation of hyponatremia\par \par Hypovolemic hyponatremia -- Asymptomatic at present time.  Suspect a combination of predisposing factors.  Patient is taking in about 4 L of fluid daily by my history.  Other factors contributing to low serum sodium may be ADH stimulation from TCA for depression.  There is also a minor contribution from his hyperglycemia.  I will repeat blood and urine tests today.  I stressed fluid restriction and we spoke at length about cutting back on alcohol use.  \par \par The patient's blood pressure was noted to be elevated.  This seems to be a one time reading and not a trend.  possible [facility].  will d/w dr. bae\par \par The time spent is inclusive of the time it took to see, evaluate, and manage the patient.  Time is inclusive of the time taken to review chart, reconcile medications, document findings, and communicate with other providers (when applicable).\par

## 2023-05-23 NOTE — HISTORY OF PRESENT ILLNESS
[FreeTextEntry1] : 5/23/23 -- Today I had the pleasure of meeting Maximiliano Renteria.  He is a 72 years old man who is retired and here for evaluation today with his wife.  He is here for evaluation of hyponatremia.  His history is relevant for depression on TCA and aripiprazole, pre-diabetes, hypothyroidism.  He has had low serum sodium since about 2020, at least.  He has some urinary frequency, no chest pain no cough, no nvd, no pain.  Social history is significant for 6 beers per day, smokes cigars.  Has been complaining of shakes and is due to see a neurologist.

## 2023-05-24 ENCOUNTER — NON-APPOINTMENT (OUTPATIENT)
Age: 73
End: 2023-05-24

## 2023-05-24 LAB
ALBUMIN SERPL ELPH-MCNC: 4.9 G/DL
ANION GAP SERPL CALC-SCNC: 19 MMOL/L
BUN SERPL-MCNC: 11 MG/DL
CALCIUM SERPL-MCNC: 9.7 MG/DL
CHLORIDE SERPL-SCNC: 94 MMOL/L
CO2 SERPL-SCNC: 22 MMOL/L
CREAT SERPL-MCNC: 1.02 MG/DL
CREAT SPEC-SCNC: 14 MG/DL
EGFR: 79 ML/MIN/1.73M2
GLUCOSE SERPL-MCNC: 109 MG/DL
MAGNESIUM SERPL-MCNC: 2.1 MG/DL
OSMOLALITY SERPL: 282 MOSMOL/KG
OSMOLALITY UR: 180 MOSM/KG
PHOSPHATE SERPL-MCNC: 2.9 MG/DL
POTASSIUM SERPL-SCNC: 3.8 MMOL/L
SODIUM ?TM SUB UR QN: 28 MMOL/L
SODIUM SERPL-SCNC: 136 MMOL/L
URATE SERPL-MCNC: 5.5 MG/DL
URATE UR-MCNC: 9.1 MG/DL

## 2023-07-31 ENCOUNTER — APPOINTMENT (OUTPATIENT)
Dept: INTERNAL MEDICINE | Facility: CLINIC | Age: 73
End: 2023-07-31
Payer: MEDICARE

## 2023-07-31 ENCOUNTER — LABORATORY RESULT (OUTPATIENT)
Age: 73
End: 2023-07-31

## 2023-07-31 VITALS — HEIGHT: 63 IN | BODY MASS INDEX: 26.75 KG/M2 | WEIGHT: 151 LBS

## 2023-07-31 VITALS — SYSTOLIC BLOOD PRESSURE: 160 MMHG | DIASTOLIC BLOOD PRESSURE: 90 MMHG

## 2023-07-31 DIAGNOSIS — E87.1 HYPO-OSMOLALITY AND HYPONATREMIA: ICD-10-CM

## 2023-07-31 PROCEDURE — 93000 ELECTROCARDIOGRAM COMPLETE: CPT | Mod: 59

## 2023-07-31 PROCEDURE — 36415 COLL VENOUS BLD VENIPUNCTURE: CPT

## 2023-07-31 PROCEDURE — 99215 OFFICE O/P EST HI 40 MIN: CPT | Mod: 25

## 2023-07-31 RX ORDER — NIRMATRELVIR AND RITONAVIR 300-100 MG
20 X 150 MG & KIT ORAL
Qty: 30 | Refills: 0 | Status: DISCONTINUED | COMMUNITY
Start: 2022-12-16 | End: 2023-07-31

## 2023-07-31 NOTE — ASSESSMENT
[FreeTextEntry1] : Problems New onset of hypertension-the patient has a tachycardia with a right bundle branch block on his EKG.  I instructed the patient on a low-sodium diet and started him on atenolol 25 mg twice daily with instructions to return next week and in addition I referred him to cardiology for evaluation Hyponatremia-the patient was instructed on water restriction to a quart a day and because of his hypertension I did not add sodium to his diet.  Bloods were drawn today to check his sodium potassium BUN and creatinine Hypercholesterolemia-the patient continues on atorvastatin 10 mg daily.  As this can affect the liver bloods were drawn to check his SGOT SGPT and LDH Buccal lesion-he was referred to Dr. Ortiz

## 2023-07-31 NOTE — HISTORY OF PRESENT ILLNESS
[de-identified] : This is a 72-year-old gentleman with a history of mood disorder, hyponatremia, thyroid disorder, hypercholesterolemia who is here today for a follow-up visit the patient is complaining of a sore in the lateral aspect of his left buccal surface and also is complaining of being told that he is hypertensive

## 2023-07-31 NOTE — PHYSICAL EXAM
[Normal] : soft, non-tender, non-distended, no masses palpated, no HSM and normal bowel sounds [de-identified] : Tachycardia

## 2023-08-01 LAB
ALBUMIN SERPL ELPH-MCNC: 4.9 G/DL
ALP BLD-CCNC: 89 U/L
ALT SERPL-CCNC: 47 U/L
ANION GAP SERPL CALC-SCNC: 14 MMOL/L
APPEARANCE: CLEAR
AST SERPL-CCNC: 41 U/L
BACTERIA: NEGATIVE /HPF
BILIRUB SERPL-MCNC: 0.3 MG/DL
BILIRUBIN URINE: NEGATIVE
BLOOD URINE: NEGATIVE
BUN SERPL-MCNC: 19 MG/DL
CALCIUM SERPL-MCNC: 9.6 MG/DL
CAST: 0 /LPF
CHLORIDE SERPL-SCNC: 97 MMOL/L
CHOLEST SERPL-MCNC: 220 MG/DL
CO2 SERPL-SCNC: 25 MMOL/L
COLOR: YELLOW
CREAT SERPL-MCNC: 1.11 MG/DL
EGFR: 71 ML/MIN/1.73M2
EPITHELIAL CELLS: 0 /HPF
ESTIMATED AVERAGE GLUCOSE: 123 MG/DL
FERRITIN SERPL-MCNC: 612 NG/ML
GLUCOSE QUALITATIVE U: NEGATIVE MG/DL
GLUCOSE SERPL-MCNC: 155 MG/DL
HBA1C MFR BLD HPLC: 5.9 %
HDLC SERPL-MCNC: 42 MG/DL
KETONES URINE: ABNORMAL MG/DL
LDLC SERPL CALC-MCNC: 75 MG/DL
LEUKOCYTE ESTERASE URINE: NEGATIVE
MICROSCOPIC-UA: NORMAL
NITRITE URINE: NEGATIVE
NONHDLC SERPL-MCNC: 178 MG/DL
PH URINE: 6
POTASSIUM SERPL-SCNC: 4.3 MMOL/L
PROT SERPL-MCNC: 7.5 G/DL
PROTEIN URINE: NORMAL MG/DL
RED BLOOD CELLS URINE: 0 /HPF
SODIUM SERPL-SCNC: 136 MMOL/L
SPECIFIC GRAVITY URINE: 1.02
T3RU NFR SERPL: 0.8 TBI
T4 SERPL-MCNC: 5.8 UG/DL
TRIGL SERPL-MCNC: 656 MG/DL
TSH SERPL-ACNC: 4.32 UIU/ML
URATE SERPL-MCNC: 7.2 MG/DL
UROBILINOGEN URINE: 0.2 MG/DL
WHITE BLOOD CELLS URINE: 0 /HPF

## 2023-08-02 ENCOUNTER — NON-APPOINTMENT (OUTPATIENT)
Age: 73
End: 2023-08-02

## 2023-08-02 ENCOUNTER — APPOINTMENT (OUTPATIENT)
Dept: CARDIOLOGY | Facility: CLINIC | Age: 73
End: 2023-08-02
Payer: MEDICARE

## 2023-08-02 VITALS
DIASTOLIC BLOOD PRESSURE: 86 MMHG | WEIGHT: 152 LBS | HEART RATE: 104 BPM | SYSTOLIC BLOOD PRESSURE: 140 MMHG | BODY MASS INDEX: 26.93 KG/M2 | OXYGEN SATURATION: 99 %

## 2023-08-02 DIAGNOSIS — R00.0 TACHYCARDIA, UNSPECIFIED: ICD-10-CM

## 2023-08-02 PROCEDURE — 93000 ELECTROCARDIOGRAM COMPLETE: CPT

## 2023-08-02 PROCEDURE — 99213 OFFICE O/P EST LOW 20 MIN: CPT

## 2023-08-07 ENCOUNTER — APPOINTMENT (OUTPATIENT)
Dept: PSYCHIATRY | Facility: CLINIC | Age: 73
End: 2023-08-07
Payer: MEDICARE

## 2023-08-07 DIAGNOSIS — R53.83 OTHER FATIGUE: ICD-10-CM

## 2023-08-07 PROCEDURE — 99214 OFFICE O/P EST MOD 30 MIN: CPT

## 2023-08-07 NOTE — FAMILY HISTORY
[FreeTextEntry1] : Patient born in [facility] mother had Alzheimer's disease she was depressed paranoid Father  of heart disease he has a sister and brother who are twins age 68.  No other psychiatric alcohol or drug history in the family.  No abuse history growing up.

## 2023-08-07 NOTE — SOCIAL HISTORY
[FreeTextEntry1] : Patient grew up in [facility].  He attended [employer] graduating 1969.  High school was good he was a good student he then went to [employer] graduating in 1972 with an associates degree in electrical engineering.  In college she partied a lot with member of .  He then went to work for an [employer] company for 2 years and then for [employer] for 39 years.  He retired in 2011.  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  Patient was  in 2003.  At his first marriage.  His stepson is age 40.

## 2023-08-07 NOTE — HISTORY OF PRESENT ILLNESS
[de-identified] : Patient is a 71-year-old male, , retired from [employer].  Patient lives at home with wife age 71 who is also retired.  Patient states he needs a new psychiatrist.  He has been seeing a psychiatrist who is paying cash to.  He feels that he can no longer.  Afford to do that.  Patient currently stable on Elavil 100 mg Abilify 2 mg and clonazepam 0.5 mg as needed.  He states he has been depressed his whole life.  Patient has several minor stressors in life right now.  Patient states his wife had owned a property in [place].  Her son took over and bought half that estate.  The patient and his wife own accord over the state and another family member owns a quarter the estate.  His wife son lives there refuses to go out and does not pay anything for being there.  The patient feels he needs them to sell the house so he can have some extra income to pay off his mortgage and live.  Patient also has a house in [place] that he grew up in that his mother had that his sister is currently living in.  He needs to have that sold so he can have the extra income.  Other than that he has no real stressors.  Since retiring the patient spends his days doing work around the house.  During the day still smokes cigars and drinks about 6-7 beers a day.  His diet is good he does not exercise.\par  \par  Patient gets up at about 9:00 in the morning.  In the morning he will have something eat do some work around the house in the afternoon his home in the evening he will have dinner watch television and go to bed anywhere from 11 PM to 1 AM.  He has no problem falling asleep awakens 1 time to go the bathroom.  Appetite normal height 5 feet 3 inches weight 145 pounds. [FreeTextEntry1] : Still ruminating about financial issues and wife giving proceeds of house to son.  Trying to move on.  Started on atenolol for tachycardia and hypertension.  Going to visit a friend and [place].  Trying to organize a trip to [place] with wife.

## 2023-08-07 NOTE — PAST MEDICAL HISTORY
[FreeTextEntry1] : Patient states he was first depressed at age 17.  He has been in and out of therapy his entire life.  He was first started on antidepressant medication somewhere in the 1990s.  Remembers being on Prozac Lexapro on multiple other medications.  He has been stable on Elavil and Abilify for the past 3 years.  Patient is never been in a psychiatric hospital. Energy (Optional-Please Include Units): 100 kV

## 2023-08-07 NOTE — CURRENT PSYCHIATRIC SYMPTOMS
[de-identified] : Denied [de-identified] : Denied [de-identified] : Denied [de-identified] : Denied [de-identified] : None [de-identified] : None [de-identified] : None

## 2023-08-08 ENCOUNTER — APPOINTMENT (OUTPATIENT)
Dept: INTERNAL MEDICINE | Facility: CLINIC | Age: 73
End: 2023-08-08
Payer: MEDICARE

## 2023-08-08 VITALS — DIASTOLIC BLOOD PRESSURE: 90 MMHG | SYSTOLIC BLOOD PRESSURE: 150 MMHG

## 2023-08-08 VITALS — WEIGHT: 152 LBS | BODY MASS INDEX: 26.93 KG/M2 | HEIGHT: 63 IN

## 2023-08-08 DIAGNOSIS — F41.9 ANXIETY DISORDER, UNSPECIFIED: ICD-10-CM

## 2023-08-08 PROCEDURE — 99213 OFFICE O/P EST LOW 20 MIN: CPT

## 2023-08-08 NOTE — HISTORY OF PRESENT ILLNESS
[de-identified] : This is a 72-year-old gentleman with a history of anxiety, depression, hypertension, hypertriglyceridemia who is here for follow-up visit.  On last visit the patient was started on atenolol 25 mg twice a day however he is only taking 25 mg daily.

## 2023-08-08 NOTE — ASSESSMENT
[FreeTextEntry1] : Problems Hypertension and tachycardia-the patient was advised to take his atenolol 25 g twice a day not once a day. Hypertriglyceridemia-the patient was started on Vascepta 2 tablets twice a day and he was instructed on a low carbohydrate diet

## 2023-08-08 NOTE — PHYSICAL EXAM
[Normal] : soft, non-tender, non-distended, no masses palpated, no HSM and normal bowel sounds [de-identified] : Tachycardia

## 2023-08-16 ENCOUNTER — APPOINTMENT (OUTPATIENT)
Dept: CARDIOLOGY | Facility: CLINIC | Age: 73
End: 2023-08-16
Payer: MEDICARE

## 2023-08-16 VITALS
RESPIRATION RATE: 17 BRPM | BODY MASS INDEX: 26.93 KG/M2 | SYSTOLIC BLOOD PRESSURE: 148 MMHG | HEIGHT: 63 IN | WEIGHT: 152 LBS | DIASTOLIC BLOOD PRESSURE: 76 MMHG | HEART RATE: 56 BPM | OXYGEN SATURATION: 98 %

## 2023-08-16 PROCEDURE — 99214 OFFICE O/P EST MOD 30 MIN: CPT

## 2023-08-16 PROCEDURE — 93000 ELECTROCARDIOGRAM COMPLETE: CPT

## 2023-08-16 RX ORDER — ATENOLOL 25 MG/1
25 TABLET ORAL
Qty: 60 | Refills: 3 | Status: DISCONTINUED | COMMUNITY
Start: 2023-08-01 | End: 2023-08-16

## 2023-08-16 NOTE — HISTORY OF PRESENT ILLNESS
[FreeTextEntry1] : He was seen today because of being told that he was hypertensive.  He otherwise feels well and has no discomfort or chest pains.   Prir: Feels well. Working. Not exercising. No chest pain or palpitations. Reviewed echo: Moderate AI unchanged from 2015.  Prior: Dear Jaime, Thank you for referring him for cardiovascular evaluation.   He is a 70-year-old with a history of paroxysmal atrial tachycardia and tachycardia who was noted to have an elevated heart rate of 90. He was seen in your office for routine evaluation and noted to have an elevated heart rate. He denies any lightheadedness, dizziness or syncope. He does drink a fair amount of coffee. Prior paroxysmal atrial tachycardia and sinus tachycardia work-up in 2015 showed normal exercise capacity with no echocardiographic evidence of ischemia. He has a history of gastrointestinal ulcer and mood disorder.  He is currently being treated for hypothyroidism. He has no history of coronary artery disease or smoking.

## 2023-08-16 NOTE — DISCUSSION/SUMMARY
[EKG obtained to assist in diagnosis and management of assessed problem(s)] : EKG obtained to assist in diagnosis and management of assessed problem(s) [FreeTextEntry1] : He is a 72-year-old with a history of hypothyroidism, mood disorder, hyperlipidemia and Now with hypertension and atrial tachycardia.  I suggested uptitrating atenolol to 25 twice daily and having him return for repeat blood pressure and ECG. Consideration for more beta selective beta-blocker after next visit.

## 2023-08-16 NOTE — DISCUSSION/SUMMARY
[FreeTextEntry1] : He is a 72-year-old with a history of hypothyroidism, mood disorder, hyperlipidemia, hypertension and atrial tachycardia.  Paroxysmal atrial tachycardia: I suggested changing his atenolol to nebivolol as it is a better antihypertensive agent and is very beta selective which will hopefully suppress his atrial tachycardia. His blood pressure is borderline and he will follow-up in 2 to 3 weeks for blood pressure check with Louise.  I would add another agent if his blood pressure remains elevated. Hypertriglyceridemia: Continue Lovaza.  Labs at or prior 2 next visit.  [EKG obtained to assist in diagnosis and management of assessed problem(s)] : EKG obtained to assist in diagnosis and management of assessed problem(s)

## 2023-08-16 NOTE — HISTORY OF PRESENT ILLNESS
[FreeTextEntry1] : He has been taking atenolol twice daily and reports feeling okay overall. He has no lightheadedness, dizziness or syncope.   Prior: He was seen today because of being told that he was hypertensive.  He otherwise feels well and has no discomfort or chest pains.  Prir: Feels well. Working. Not exercising. No chest pain or palpitations. Reviewed echo: Moderate AI unchanged from 2015.  Prior: Dear Jaime, Thank you for referring him for cardiovascular evaluation.   He is a 70-year-old with a history of paroxysmal atrial tachycardia and tachycardia who was noted to have an elevated heart rate of 90. He was seen in your office for routine evaluation and noted to have an elevated heart rate. He denies any lightheadedness, dizziness or syncope. He does drink a fair amount of coffee. Prior paroxysmal atrial tachycardia and sinus tachycardia work-up in 2015 showed normal exercise capacity with no echocardiographic evidence of ischemia. He has a history of gastrointestinal ulcer and mood disorder.  He is currently being treated for hypothyroidism. He has no history of coronary artery disease or smoking.

## 2023-09-06 ENCOUNTER — APPOINTMENT (OUTPATIENT)
Dept: CARDIOLOGY | Facility: CLINIC | Age: 73
End: 2023-09-06
Payer: MEDICARE

## 2023-09-06 VITALS
SYSTOLIC BLOOD PRESSURE: 170 MMHG | WEIGHT: 154 LBS | HEART RATE: 103 BPM | DIASTOLIC BLOOD PRESSURE: 90 MMHG | OXYGEN SATURATION: 97 % | HEIGHT: 63 IN | BODY MASS INDEX: 27.29 KG/M2 | RESPIRATION RATE: 17 BRPM

## 2023-09-06 PROCEDURE — 99213 OFFICE O/P EST LOW 20 MIN: CPT

## 2023-09-06 NOTE — DISCUSSION/SUMMARY
"              After Visit Summary   12/1/2017    José Aguirre    MRN: [id number]           Patient Information     Date Of Birth          1934        Visit Information        Provider Department      12/1/2017 9:00 AM Radhames Vargas MD [facility]        Today's Diagnoses     Coronary artery disease involving native coronary artery of native heart without angina pectoris    -  1    Peripheral vascular disease        Hyperlipidemia LDL goal <70        Claudication of both lower extremities (H)        CKD (chronic kidney disease), stage III           Follow-ups after your visit        Additional Services     Follow-Up with Cardiologist                 Future tests that were ordered for you today     Open Future Orders        Priority Expected Expires Ordered    Follow-Up with Cardiologist Routine 12/1/2018 12/2/2018 12/1/2017            Who to contact     If you have questions or need follow up information about today's clinic visit or your schedule please contact [facility] directly at [phone].  Normal or non-critical lab and imaging results will be communicated to you by [email or web address], letter or phone within 4 business days after the clinic has received the results. If you do not hear from us within 7 days, please contact the clinic through entegra techn[employer] or phone. If you have a critical or abnormal lab result, we will notify you by phone as soon as possible.  Submit refill requests through [employer] or call your pharmacy and they will forward the refill request to us. Please allow 3 business days for your refill to be completed.          Additional Information About Your Visit        [email or web address] Information     [employer] lets you send messages to your doctor, view your test results, renew your prescriptions, schedule appointments and more. To sign up, go to [email or web address] . Click on \"Log in\" on the left side of the " [FreeTextEntry1] : He is a 72-year-old with a history of hypothyroidism, mood disorder, hyperlipidemia, hypertension and atrial tachycardia.  Paroxysmal atrial tachycardia: Continue nebivolol as it is a better antihypertensive agent and is very beta selective which will hopefully suppress his atrial tachycardia. His blood pressure remains elevated. For a patient with hypertension and borderline diabetes, will add valsartan 80 mg daily.  Hypertriglyceridemia: Continue Vascepa.   He will follow up in 3-4 weeks for BP check, labs to monitor renal function, electrolytes and lipids. "screen, which will take you to the Welcome page. Then click on \"Sign up Now\" on the right side of the page.     You will be asked to enter the access code listed below, as well as some personal information. Please follow the directions to create your username and password.     Your access code is: [email or web address]  Expires: 3/1/2018  9:36 AM     Your access code will  in 90 days. If you need help or a new code, please call your [facility] clinic or [phone].        Care EveryWhere ID     This is your Care EveryWhere ID. This could be used by other organizations to access your [facility] medical records  [id number]        Your Vitals Were     Pulse Height BMI (Body Mass Index)             60 1.727 m (5' 8\") 28.9 kg/m2          Blood Pressure from Last 3 Encounters:   17 122/56   17 107/62   17 128/62    Weight from Last 3 Encounters:   17 86.2 kg (190 lb 1.6 oz)   17 86.2 kg (190 lb)   17 83.7 kg (184 lb 8.4 oz)              We Performed the Following     Follow-Up with Cardiologist        Primary Care Provider Office Phone # Fax #    Inderjit Kenny [phone]       [facility] [address] [place] [address]        Equal Access to Services     [facility]: Hadii aad ku hadasho Soomaali, waaxda luqadaha, qaybta kaalmada adeegyada, vito herrmann . So [facility] [phone].    ATENCIÓN: Si habla español, tiene a spencer disposición servicios gratuitos de asistencia lingüística. Llame al [phone].    We comply with applicable federal civil rights laws and [place] laws. We do not discriminate on the basis of race, color, national origin, age, disability, sex, sexual orientation, or gender identity.            Thank you!     Thank you for choosing [facility]   [place]  for your care. Our goal is always to provide you with excellent care. Hearing back from our patients is one way we can " continue to improve our services. Please take a few minutes to complete the written survey that you may receive in the mail after your visit with us. Thank you!             Your Updated Medication List - Protect others around you: Learn how to safely use, store and throw away your medicines at www.disposemymeds.org.          This list is accurate as of: 12/1/17  9:36 AM.  Always use your most recent med list.                   Brand Name Dispense Instructions for use Diagnosis    ASPIRIN PO      Take 325 mg by mouth daily.        carvedilol 25 MG tablet    COREG     Take 25 mg by mouth 2 times daily (with meals)        clopidogrel 75 MG tablet    PLAVIX     Take 75 mg by mouth daily On hold starting 7/7/17        ferrous sulfate 325 (65 FE) MG tablet    IRON     Take 325 mg by mouth 2 times daily        furosemide 20 MG tablet    LASIX     Take 20 mg by mouth daily        OMEPRAZOLE PO      Take 20 mg by mouth daily        rosuvastatin 40 MG tablet    CRESTOR    30 tablet    Take 1 tablet (40 mg) by mouth daily    CAD (coronary artery disease)       triamcinolone 0.1 % cream    KENALOG     Apply topically 2 times daily as needed for irritation        vitamin D 2000 UNITS tablet     30 tablet    Take 2,000 Units by mouth 2 times daily

## 2023-09-06 NOTE — HISTORY OF PRESENT ILLNESS
[FreeTextEntry1] : Maximiliano Renteria returns today for follow up of his hypertension and atrial tachycardia. He continues to feel well overall and denies any chest discomfort, shortness of breath, palpitations, lightheadedness or syncope. Home blood pressure readings are elevated when taken (few times a week), typically 160/80 mm Hg.   Prior: He has been taking atenolol twice daily and reports feeling okay overall. He has no lightheadedness, dizziness or syncope.  Prior: He was seen today because of being told that he was hypertensive.  He otherwise feels well and has no discomfort or chest pains.  Prir: Feels well. Working. Not exercising. No chest pain or palpitations. Reviewed echo: Moderate AI unchanged from 2015.  Prior: Dear Jaime, Thank you for referring him for cardiovascular evaluation.   He is a 70-year-old with a history of paroxysmal atrial tachycardia and tachycardia who was noted to have an elevated heart rate of 90. He was seen in your office for routine evaluation and noted to have an elevated heart rate. He denies any lightheadedness, dizziness or syncope. He does drink a fair amount of coffee. Prior paroxysmal atrial tachycardia and sinus tachycardia work-up in 2015 showed normal exercise capacity with no echocardiographic evidence of ischemia. He has a history of gastrointestinal ulcer and mood disorder.  He is currently being treated for hypothyroidism. He has no history of coronary artery disease or smoking.

## 2023-09-21 ENCOUNTER — APPOINTMENT (OUTPATIENT)
Dept: INTERNAL MEDICINE | Facility: CLINIC | Age: 73
End: 2023-09-21
Payer: MEDICARE

## 2023-09-21 VITALS — SYSTOLIC BLOOD PRESSURE: 130 MMHG | DIASTOLIC BLOOD PRESSURE: 70 MMHG

## 2023-09-21 VITALS — HEIGHT: 63 IN | BODY MASS INDEX: 27.11 KG/M2 | WEIGHT: 153 LBS

## 2023-09-21 PROCEDURE — 99212 OFFICE O/P EST SF 10 MIN: CPT

## 2023-10-09 ENCOUNTER — RX RENEWAL (OUTPATIENT)
Age: 73
End: 2023-10-09

## 2023-10-10 LAB
ANION GAP SERPL CALC-SCNC: 16 MMOL/L
BUN SERPL-MCNC: 18 MG/DL
CALCIUM SERPL-MCNC: 9.9 MG/DL
CHLORIDE SERPL-SCNC: 94 MMOL/L
CHOLEST SERPL-MCNC: 184 MG/DL
CO2 SERPL-SCNC: 24 MMOL/L
CREAT SERPL-MCNC: 1.03 MG/DL
EGFR: 78 ML/MIN/1.73M2
GLUCOSE SERPL-MCNC: 169 MG/DL
HDLC SERPL-MCNC: 46 MG/DL
LDLC SERPL CALC-MCNC: 96 MG/DL
NONHDLC SERPL-MCNC: 137 MG/DL
POTASSIUM SERPL-SCNC: 4.7 MMOL/L
SODIUM SERPL-SCNC: 134 MMOL/L
TRIGL SERPL-MCNC: 243 MG/DL

## 2023-10-11 ENCOUNTER — APPOINTMENT (OUTPATIENT)
Dept: CARDIOLOGY | Facility: CLINIC | Age: 73
End: 2023-10-11
Payer: MEDICARE

## 2023-10-11 VITALS
BODY MASS INDEX: 27.28 KG/M2 | OXYGEN SATURATION: 98 % | SYSTOLIC BLOOD PRESSURE: 160 MMHG | WEIGHT: 154 LBS | HEART RATE: 75 BPM | DIASTOLIC BLOOD PRESSURE: 90 MMHG

## 2023-10-11 PROCEDURE — 99214 OFFICE O/P EST MOD 30 MIN: CPT

## 2023-11-20 DIAGNOSIS — E78.1 PURE HYPERGLYCERIDEMIA: ICD-10-CM

## 2023-11-21 LAB
ALBUMIN SERPL ELPH-MCNC: 4.3 G/DL
ALP BLD-CCNC: 68 U/L
ALT SERPL-CCNC: 40 U/L
ANION GAP SERPL CALC-SCNC: 11 MMOL/L
AST SERPL-CCNC: 32 U/L
BILIRUB SERPL-MCNC: 0.2 MG/DL
BUN SERPL-MCNC: 13 MG/DL
CALCIUM SERPL-MCNC: 9 MG/DL
CHLORIDE SERPL-SCNC: 96 MMOL/L
CHOLEST SERPL-MCNC: 149 MG/DL
CO2 SERPL-SCNC: 27 MMOL/L
CREAT SERPL-MCNC: 1.09 MG/DL
EGFR: 73 ML/MIN/1.73M2
GLUCOSE SERPL-MCNC: 179 MG/DL
HDLC SERPL-MCNC: 47 MG/DL
LDLC SERPL CALC-MCNC: 74 MG/DL
NONHDLC SERPL-MCNC: 102 MG/DL
POTASSIUM SERPL-SCNC: 5 MMOL/L
PROT SERPL-MCNC: 6.6 G/DL
SODIUM SERPL-SCNC: 134 MMOL/L
TRIGL SERPL-MCNC: 166 MG/DL

## 2023-11-22 ENCOUNTER — APPOINTMENT (OUTPATIENT)
Dept: CARDIOLOGY | Facility: CLINIC | Age: 73
End: 2023-11-22
Payer: MEDICARE

## 2023-11-22 VITALS
DIASTOLIC BLOOD PRESSURE: 76 MMHG | HEART RATE: 70 BPM | BODY MASS INDEX: 27.11 KG/M2 | SYSTOLIC BLOOD PRESSURE: 158 MMHG | WEIGHT: 153 LBS | OXYGEN SATURATION: 100 % | HEIGHT: 63 IN

## 2023-11-22 PROCEDURE — 99213 OFFICE O/P EST LOW 20 MIN: CPT

## 2023-11-22 RX ORDER — VALSARTAN 160 MG/1
160 TABLET, COATED ORAL
Qty: 90 | Refills: 3 | Status: ACTIVE | COMMUNITY
Start: 2023-09-06 | End: 1900-01-01

## 2023-12-11 ENCOUNTER — APPOINTMENT (OUTPATIENT)
Dept: UROLOGY | Facility: CLINIC | Age: 73
End: 2023-12-11
Payer: MEDICARE

## 2023-12-11 PROCEDURE — 99214 OFFICE O/P EST MOD 30 MIN: CPT

## 2023-12-11 RX ORDER — FINASTERIDE 5 MG/1
5 TABLET, FILM COATED ORAL DAILY
Qty: 90 | Refills: 3 | Status: ACTIVE | COMMUNITY
Start: 2022-09-29 | End: 1900-01-01

## 2023-12-21 ENCOUNTER — APPOINTMENT (OUTPATIENT)
Dept: INTERNAL MEDICINE | Facility: CLINIC | Age: 73
End: 2023-12-21
Payer: MEDICARE

## 2023-12-21 ENCOUNTER — LABORATORY RESULT (OUTPATIENT)
Age: 73
End: 2023-12-21

## 2023-12-21 VITALS — WEIGHT: 151 LBS | BODY MASS INDEX: 26.75 KG/M2 | HEIGHT: 63 IN

## 2023-12-21 VITALS — SYSTOLIC BLOOD PRESSURE: 130 MMHG | DIASTOLIC BLOOD PRESSURE: 80 MMHG

## 2023-12-21 DIAGNOSIS — I10 ESSENTIAL (PRIMARY) HYPERTENSION: ICD-10-CM

## 2023-12-21 DIAGNOSIS — F33.9 MAJOR DEPRESSIVE DISORDER, RECURRENT, UNSPECIFIED: ICD-10-CM

## 2023-12-21 PROCEDURE — 36415 COLL VENOUS BLD VENIPUNCTURE: CPT

## 2023-12-21 PROCEDURE — 99214 OFFICE O/P EST MOD 30 MIN: CPT | Mod: 25

## 2023-12-21 NOTE — ASSESSMENT
[FreeTextEntry1] : Problems Chronic fatigue-the patient complains of feeling tired all the time.  I reviewed his medications with him and he is on a host of medications that can induce fatigue.  In addition he does admit to snoring at night but has not had a sleep apnea study.  I referred him to the sleep apnea center for evaluation. Hypertension-his blood pressure is perfectly controlled 130/80 on valsartan 160 mg daily Hypertriglyceridemia-he continues on his vascepta Hypercholesterolemia-he is on atorvastatin 40 mg daily.  Bloods were obtained to check his lipids liver enzymes and BUN and creatinine.  In addition I referred him to sleep apnea center for evaluation

## 2023-12-21 NOTE — HEALTH RISK ASSESSMENT
[No] : No [Never (0 pts)] : Never (0 points) [No falls in past year] : Patient reported no falls in the past year [0] : 2) Feeling down, depressed, or hopeless: Not at all (0) [PHQ-2 Negative - No further assessment needed] : PHQ-2 Negative - No further assessment needed [[id number]] : 0

## 2023-12-21 NOTE — HISTORY OF PRESENT ILLNESS
Patient armband removed and shredded      DISCHARGE SUMMARY from Nurse    PATIENT INSTRUCTIONS:    After general anesthesia or intravenous sedation, for 24 hours or while taking prescription Narcotics:  · Limit your activities  · Do not drive and operate hazardous machinery  · Do not make important personal or business decisions  · Do  not drink alcoholic beverages  · If you have not urinated within 8 hours after discharge, please contact your surgeon on call. Report the following to your surgeon:  · Excessive pain, swelling, redness or odor of or around the surgical area  · Temperature over 100.5  · Nausea and vomiting lasting longer than 4 hours or if unable to take medications  · Any signs of decreased circulation or nerve impairment to extremity: change in color, persistent  numbness, tingling, coldness or increase pain  · Any questions    What to do at Home:  Recommended activity: Activity as tolerated within restrictions detailed by provider    If you experience any of the following symptoms Nausea, vomiting, diarrhea, fever greater than 100.5, dizziness, severe headache, shortness of breath, chest pain, increased pain, please follow up with PCP. *  Please give a list of your current medications to your Primary Care Provider. *  Please update this list whenever your medications are discontinued, doses are      changed, or new medications (including over-the-counter products) are added. *  Please carry medication information at all times in case of emergency situations. These are general instructions for a healthy lifestyle:    No smoking/ No tobacco products/ Avoid exposure to second hand smoke  Surgeon General's Warning:  Quitting smoking now greatly reduces serious risk to your health.     Obesity, smoking, and sedentary lifestyle greatly increases your risk for illness    A healthy diet, regular physical exercise & weight monitoring are important for maintaining a healthy lifestyle    You may be retaining fluid if you have a history of heart failure or if you experience any of the following symptoms:  Weight gain of 3 pounds or more overnight or 5 pounds in a week, increased swelling in our hands or feet or shortness of breath while lying flat in bed. Please call your doctor as soon as you notice any of these symptoms; do not wait until your next office visit. Recognize signs and symptoms of STROKE:    F-face looks uneven    A-arms unable to move or move unevenly    S-speech slurred or non-existent    T-time-call 911 as soon as signs and symptoms begin-DO NOT go       Back to bed or wait to see if you get better-TIME IS BRAIN. Warning Signs of HEART ATTACK     Call 911 if you have these symptoms:   Chest discomfort. Most heart attacks involve discomfort in the center of the chest that lasts more than a few minutes, or that goes away and comes back. It can feel like uncomfortable pressure, squeezing, fullness, or pain.  Discomfort in other areas of the upper body. Symptoms can include pain or discomfort in one or both arms, the back, neck, jaw, or stomach.  Shortness of breath with or without chest discomfort.  Other signs may include breaking out in a cold sweat, nausea, or lightheadedness. Don't wait more than five minutes to call 911 - MINUTES MATTER! Fast action can save your life. Calling 911 is almost always the fastest way to get lifesaving treatment. Emergency Medical Services staff can begin treatment when they arrive -- up to an hour sooner than if someone gets to the hospital by car. The discharge information has been reviewed with the patient. The patient verbalized understanding. Discharge medications reviewed with the patient and appropriate educational materials and side effects teaching were provided.   ___________________________________________________________________________________________________________________________________ [de-identified] : This is a 74-year-old gentleman with a history of depressive disorder, hypertension, hypercholesterolemia, hypertriglyceridemia who is here today for his follow-up visit

## 2024-01-29 ENCOUNTER — APPOINTMENT (OUTPATIENT)
Dept: CARDIOLOGY | Facility: CLINIC | Age: 74
End: 2024-01-29
Payer: MEDICARE

## 2024-01-29 ENCOUNTER — NON-APPOINTMENT (OUTPATIENT)
Age: 74
End: 2024-01-29

## 2024-01-29 VITALS
OXYGEN SATURATION: 100 % | HEIGHT: 63 IN | BODY MASS INDEX: 27.29 KG/M2 | HEART RATE: 60 BPM | SYSTOLIC BLOOD PRESSURE: 160 MMHG | WEIGHT: 154 LBS | DIASTOLIC BLOOD PRESSURE: 76 MMHG

## 2024-01-29 DIAGNOSIS — I10 ESSENTIAL (PRIMARY) HYPERTENSION: ICD-10-CM

## 2024-01-29 DIAGNOSIS — E78.00 PURE HYPERCHOLESTEROLEMIA, UNSPECIFIED: ICD-10-CM

## 2024-01-29 PROCEDURE — 93000 ELECTROCARDIOGRAM COMPLETE: CPT | Mod: 59

## 2024-01-29 PROCEDURE — 99214 OFFICE O/P EST MOD 30 MIN: CPT

## 2024-01-29 PROCEDURE — G2211 COMPLEX E/M VISIT ADD ON: CPT

## 2024-01-29 NOTE — DISCUSSION/SUMMARY
[FreeTextEntry1] : He is a 73-year-old with a history of hypothyroidism, mood disorder, hyperlipidemia, hypertension and atrial tachycardia.  Paroxysmal atrial tachycardia: Continue nebivolol as it is a better antihypertensive agent and is very beta selective which will hopefully suppress his atrial tachycardia. ECG shows sinus bradycardia.  Blood pressure is improved. Continue valsartan 160 mg daily.  For a current smoker with hypertension, dyslipidemia and borderline diabetes will plan to obtain an echocardiogram for structural heart assessment and a nuclear stress test to assess for any evidence of ischemic disease.  Hypertriglyceridemia:  TG now 299, previously 166. Continue Vascepa. Labs reviewed.  Strongly encouraged smoking cessation.  Follow up pending results.  [EKG obtained to assist in diagnosis and management of assessed problem(s)] : EKG obtained to assist in diagnosis and management of assessed problem(s)

## 2024-01-29 NOTE — HISTORY OF PRESENT ILLNESS
[FreeTextEntry1] : Maximiliano returns today for routine scheduled follow up. He has been more tired recently and is using melatonin to sleep. He was Dr. Bello who recommended polysomnography which he has not yet scheduled. After a close friend received a cardiac stent, he is now worried about his own cardiac health.  He does not exercise. No chest discomfort, shortness of breath, palpitations, lightheadedness or syncope. He has been taking his medications as directed. He admits to smoking cigars weekly.    Prior: Celebrating his birthday tomorrow with his family on [place]. Otherwise feeling well, some stress about moving his sister.  No chest discomfort, shortness of breath, palpitations, lightheadedness or syncope. He did not increase his valsartan last time as directed; he is taking increased atorvastatin.    Prior: Maximiliano returns today for follow up of his hypertension. He continues to feel well overall and denies any chest discomfort, shortness of breath, palpitations, lightheadedness or syncope. Home blood pressure readings have improved following the addition of low dose valsartan, but are still elevated.   Prior: Maximiliano Renteria returns today for follow up of his hypertension and atrial tachycardia. He continues to feel well overall and denies any chest discomfort, shortness of breath, palpitations, lightheadedness or syncope. Home blood pressure readings are elevated when taken (few times a week), typically 160/80 mm Hg.   Prior: He has been taking atenolol twice daily and reports feeling okay overall. He has no lightheadedness, dizziness or syncope.  Prior: He was seen today because of being told that he was hypertensive.  He otherwise feels well and has no discomfort or chest pains.  Prir: Feels well. Working. Not exercising. No chest pain or palpitations. Reviewed echo: Moderate AI unchanged from 2015.  Prior: Dear Jaime, Thank you for referring him for cardiovascular evaluation.   He is a 70-year-old with a history of paroxysmal atrial tachycardia and tachycardia who was noted to have an elevated heart rate of 90. He was seen in your office for routine evaluation and noted to have an elevated heart rate. He denies any lightheadedness, dizziness or syncope. He does drink a fair amount of coffee. Prior paroxysmal atrial tachycardia and sinus tachycardia work-up in 2015 showed normal exercise capacity with no echocardiographic evidence of ischemia. He has a history of gastrointestinal ulcer and mood disorder.  He is currently being treated for hypothyroidism. He has no history of coronary artery disease or smoking.

## 2024-01-29 NOTE — END OF VISIT
[FreeTextEntry3] : I saw the patient with Louise Azul NP and I agree with the clinical findings, exam and assessment and plan as above.

## 2024-01-30 ENCOUNTER — RX RENEWAL (OUTPATIENT)
Age: 74
End: 2024-01-30

## 2024-01-30 RX ORDER — LEVOTHYROXINE SODIUM 0.05 MG/1
50 TABLET ORAL
Qty: 90 | Refills: 3 | Status: ACTIVE | COMMUNITY
Start: 2018-10-20 | End: 1900-01-01

## 2024-02-05 ENCOUNTER — APPOINTMENT (OUTPATIENT)
Dept: PSYCHIATRY | Facility: CLINIC | Age: 74
End: 2024-02-05
Payer: MEDICARE

## 2024-02-05 DIAGNOSIS — F10.20 ALCOHOL DEPENDENCE, UNCOMPLICATED: ICD-10-CM

## 2024-02-05 PROCEDURE — 99215 OFFICE O/P EST HI 40 MIN: CPT

## 2024-02-05 RX ORDER — ARIPIPRAZOLE 2 MG/1
2 TABLET ORAL DAILY
Qty: 90 | Refills: 1 | Status: ACTIVE | COMMUNITY
Start: 1900-01-01 | End: 1900-01-01

## 2024-02-05 RX ORDER — CLONAZEPAM 0.5 MG/1
0.5 TABLET ORAL
Qty: 60 | Refills: 0 | Status: ACTIVE | COMMUNITY
Start: 2022-10-31 | End: 1900-01-01

## 2024-02-05 RX ORDER — ONDANSETRON 4 MG/1
4 TABLET ORAL
Qty: 10 | Refills: 0 | Status: ACTIVE | COMMUNITY
Start: 2024-02-05 | End: 1900-01-01

## 2024-02-05 RX ORDER — AMITRIPTYLINE HYDROCHLORIDE 100 MG/1
100 TABLET, FILM COATED ORAL DAILY
Qty: 90 | Refills: 1 | Status: ACTIVE | COMMUNITY
Start: 2018-03-16 | End: 1900-01-01

## 2024-02-05 NOTE — PLAN
[FreeTextEntry5] : Assessment: Patient is a 72 yo male with h/o depression and anxiety seen today for medication management. Patient is compliant with the medications, tolerating it well without any side effects. I-STOP was checked without any problems.  PLAN: Start Naltrexone 50 mg PO QD fro alcohol use disorder Start Zofran 4 mg PRN for nausea Continue Elavil 100 mg PO QHS fro depression Continue Abilify 2 mg PO QHS for depression augmentation Continue Klonopin 0.5 mg BID PRN (Takes QD PRN) - Discussed risks and benefits of medications including side effects of GI and sexual with SSRI/SNRI/TCA. Alternative strategies including no intervention discussed with patient. Patient consents to current medications as prescribed. - Discussed with patient regarding importance of abstinence and sobriety from alcohol and drugs. Educated about relationship between worsening mood/anxiety symptoms and drug use and improvement of symptoms with abstinence.  - Discussed about unpredictable effects including cardiorespiratory collapse from the combination of illicit drugs and prescribed medications. Patient verbalized understanding. - Patient understands to contact clinic prn with concerns and agrees to call 911 or go to nearest ER if symptoms worsen. - Next appointment was made by the patient in 6-8 weeks Patient was not in any distress.   I spent a total of 30 minutes for today's visit in evaluating and treating the patient as per above, including: preparing for patient's appointment (review of prior documents, [facility] ), performing a medically necessary exam/evaluation, communicating/counseling/educating the patient ordering medications

## 2024-02-05 NOTE — CURRENT PSYCHIATRIC SYMPTOMS
[de-identified] : Denied [de-identified] : Denied [de-identified] : Denied [de-identified] : Denied [de-identified] : None [de-identified] : None [de-identified] : None

## 2024-02-05 NOTE — HISTORY OF PRESENT ILLNESS
[de-identified] : Patient is a 71-year-old male, , retired from [employer].  Patient lives at home with wife age 71 who is also retired.  Patient states he needs a new psychiatrist.  He has been seeing a psychiatrist who is paying cash to.  He feels that he can no longer.  Afford to do that.  Patient currently stable on Elavil 100 mg Abilify 2 mg and clonazepam 0.5 mg as needed.  He states he has been depressed his whole life.  Patient has several minor stressors in life right now.  Patient states his wife had owned a property in [place].  Her son took over and bought half that estate.  The patient and his wife own accord over the state and another family member owns a quarter the estate.  His wife son lives there refuses to go out and does not pay anything for being there.  The patient feels he needs them to sell the house so he can have some extra income to pay off his mortgage and live.  Patient also has a house in [place] that he grew up in that his mother had that his sister is currently living in.  He needs to have that sold so he can have the extra income.  Other than that he has no real stressors.  Since retiring the patient spends his days doing work around the house.  During the day still smokes cigars and drinks about 6-7 beers a day.  His diet is good he does not exercise.\par  \par  Patient gets up at about 9:00 in the morning.  In the morning he will have something eat do some work around the house in the afternoon his home in the evening he will have dinner watch television and go to bed anywhere from 11 PM to 1 AM.  He has no problem falling asleep awakens 1 time to go the bathroom.  Appetite normal height 5 feet 3 inches weight 145 pounds. [FreeTextEntry1] : Patient is a transfer from Dr. Loomis due to insurance issues. Patient is currently on Elavil 100 mg Abilify 2 mg and Klonopin 0.5 mg PO BID (Takes QD PRN)  States he originally came to see Dr. Loomis for medication management, depression and anxiety. States he likes the current medication regimen, and it is helping him manage his mental health.   Patient states he is drinking a 6 pack of beer every night as he is bored. States he always compares himself to others, suffers from low self-esteem. Not good enough. Feels any decision he has made in his life was not the right one. Still ruminating about financial issues and wife giving proceeds of house to stepson.  He is on atenolol for tachycardia and hypertension.    Mood: stable. Depression and anxiety are manageable.  Sleeping poor sleep hygiene. Drinks 6 pack of beer every night for the past 1 year. Takes Melatonin which has helped him. Gets 7 hours of sleep broken. +Sleep fragmentation.  Appetite: increased due to increase beer. Weight 151 Ht 5'3 Energy: decreased Concentration: decreased Motivation is decreased Denies any AVH, SI or HI. Smokes 1 cigar per night Wants to decrease his cigars and alcohol.

## 2024-02-05 NOTE — SOCIAL HISTORY
[FreeTextEntry1] : Patient grew up in [place].  He attended "[employer] graduating 1969.  High school was good he was a good student he then went to [employer] graduating in 1972 with an associates degree in electrical engineering.  In college she partied a lot with member of .  He then went to work for an [employer] company for 2 years and then for [employer] for 39 years.  He retired in 2011.  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  Patient was  in 2003.  At his first marriage.  His stepson is age 40.

## 2024-02-05 NOTE — FAMILY HISTORY
[FreeTextEntry1] : Patient born in [facility] mother had Alzheimer's disease she was depressed paranoid Father  of heart disease he has a sister and brother who are twins age 68.  No other psychiatric alcohol or drug history in the family.  No abuse history growing up.

## 2024-02-05 NOTE — PAST MEDICAL HISTORY
Incision Check    Juan R Bullock is a 33 y.o. male who is s/p right median neuroplasty who presents for an *1 week incision check. Incision non erythematous and non tender to touch. Incision healing well without signs of infection. No discharge noted. Okay to shower now. Use a mild shampoo (I.e. baby shampoo) to clean incision. Patient to return in 2 weeks for suture removal. Patient to call if they have any fever or discharge from incision site.
[FreeTextEntry1] : Patient states he was first depressed at age 17.  He has been in and out of therapy his entire life.  He was first started on antidepressant medication somewhere in the 1990s.  Remembers being on Prozac Lexapro on multiple other medications.  He has been stable on Elavil and Abilify for the past 3 years.  Patient is never been in a psychiatric hospital.

## 2024-02-12 LAB
ALBUMIN SERPL ELPH-MCNC: 4.5 G/DL
ALP BLD-CCNC: 58 U/L
ALT SERPL-CCNC: 34 U/L
ANION GAP SERPL CALC-SCNC: 11 MMOL/L
AST SERPL-CCNC: 35 U/L
BASOPHILS # BLD AUTO: 0.04 K/UL
BASOPHILS NFR BLD AUTO: 0.6 %
BILIRUB SERPL-MCNC: 0.4 MG/DL
BUN SERPL-MCNC: 9 MG/DL
CALCIUM SERPL-MCNC: 9 MG/DL
CHLORIDE SERPL-SCNC: 90 MMOL/L
CHOLEST SERPL-MCNC: 195 MG/DL
CO2 SERPL-SCNC: 26 MMOL/L
CREAT SERPL-MCNC: 1.03 MG/DL
EGFR: 77 ML/MIN/1.73M2
EOSINOPHIL # BLD AUTO: 0.04 K/UL
EOSINOPHIL NFR BLD AUTO: 0.6 %
ESTIMATED AVERAGE GLUCOSE: 128 MG/DL
GLUCOSE SERPL-MCNC: 160 MG/DL
HBA1C MFR BLD HPLC: 6.1 %
HCT VFR BLD CALC: 41 %
HDLC SERPL-MCNC: 46 MG/DL
HGB BLD-MCNC: 14 G/DL
IMM GRANULOCYTES NFR BLD AUTO: 0.6 %
LDLC SERPL CALC-MCNC: 99 MG/DL
LYMPHOCYTES # BLD AUTO: 1.25 K/UL
LYMPHOCYTES NFR BLD AUTO: 18.6 %
MAN DIFF?: NORMAL
MCHC RBC-ENTMCNC: 33.2 PG
MCHC RBC-ENTMCNC: 34.1 GM/DL
MCV RBC AUTO: 97.2 FL
MONOCYTES # BLD AUTO: 0.59 K/UL
MONOCYTES NFR BLD AUTO: 8.8 %
NEUTROPHILS # BLD AUTO: 4.75 K/UL
NEUTROPHILS NFR BLD AUTO: 70.8 %
NONHDLC SERPL-MCNC: 149 MG/DL
PLATELET # BLD AUTO: 159 K/UL
POTASSIUM SERPL-SCNC: 4.2 MMOL/L
PROT SERPL-MCNC: 7 G/DL
PSA SERPL-MCNC: 1.37 NG/ML
RBC # BLD: 4.22 M/UL
RBC # FLD: 11.8 %
SODIUM SERPL-SCNC: 127 MMOL/L
T3RU NFR SERPL: <0.2 TBI
T4 SERPL-MCNC: 7.5 UG/DL
TRIGL SERPL-MCNC: 299 MG/DL
TSH SERPL-ACNC: 2.24 UIU/ML
URATE SERPL-MCNC: 6.5 MG/DL
WBC # FLD AUTO: 6.71 K/UL

## 2024-02-20 ENCOUNTER — RX RENEWAL (OUTPATIENT)
Age: 74
End: 2024-02-20

## 2024-02-20 RX ORDER — ICOSAPENT ETHYL 1 G/1
1 CAPSULE ORAL
Qty: 360 | Refills: 3 | Status: ACTIVE | COMMUNITY
Start: 2023-08-02 | End: 1900-01-01

## 2024-02-26 ENCOUNTER — APPOINTMENT (OUTPATIENT)
Dept: CARDIOLOGY | Facility: CLINIC | Age: 74
End: 2024-02-26
Payer: MEDICARE

## 2024-02-26 PROCEDURE — 93015 CV STRESS TEST SUPVJ I&R: CPT

## 2024-02-26 PROCEDURE — 93306 TTE W/DOPPLER COMPLETE: CPT

## 2024-02-26 PROCEDURE — A9500: CPT

## 2024-02-26 PROCEDURE — 78452 HT MUSCLE IMAGE SPECT MULT: CPT

## 2024-03-04 ENCOUNTER — APPOINTMENT (OUTPATIENT)
Dept: PSYCHIATRY | Facility: CLINIC | Age: 74
End: 2024-03-04
Payer: MEDICARE

## 2024-03-04 PROCEDURE — 99214 OFFICE O/P EST MOD 30 MIN: CPT

## 2024-03-04 RX ORDER — NALTREXONE HYDROCHLORIDE 50 MG/1
50 TABLET, FILM COATED ORAL
Qty: 90 | Refills: 0 | Status: ACTIVE | COMMUNITY
Start: 2024-02-05 | End: 1900-01-01

## 2024-03-04 NOTE — SOCIAL HISTORY
[FreeTextEntry1] : Patient grew up in [facility].  He attended [employer] graduating 1969.  High school was good he was a good student he then went to [employer] graduating in 1972 with an associates degree in electrical engineering.  In college she partied a lot with member of .  He then went to work for an [employer] company for 2 years and then for [employer] for 39 years.  He retired in 2011.  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  Patient was  in 2003.  At his first marriage.  His stepson is age 40.

## 2024-03-04 NOTE — PLAN
[FreeTextEntry5] : Assessment: Patient is a 74 yo male with h/o depression and anxiety seen today for medication management. Patient is compliant with the medications, tolerating it well without any side effects. I-STOP was checked without any problems.  PLAN: Continue Naltrexone 50 mg PO QD for alcohol use disorder Continue Zofran 4 mg PRN for nausea Continue Elavil 100 mg PO QHS for depression Continue Abilify 2 mg PO QHS for depression augmentation Continue Klonopin 0.5 mg BID PRN (Takes QD PRN) - Discussed risks and benefits of medications including side effects of GI and sexual with SSRI/SNRI/TCA. Alternative strategies including no intervention discussed with patient. Patient consents to current medications as prescribed. - Discussed with patient regarding importance of abstinence and sobriety from alcohol and drugs. Educated about relationship between worsening mood/anxiety symptoms and drug use and improvement of symptoms with abstinence.  - Discussed about unpredictable effects including cardiorespiratory collapse from the combination of illicit drugs and prescribed medications. Patient verbalized understanding. - Patient understands to contact clinic prn with concerns and agrees to call 911 or go to nearest ER if symptoms worsen. - Next appointment was made by the patient in 3 months Patient was not in any distress.

## 2024-03-04 NOTE — HISTORY OF PRESENT ILLNESS
[FreeTextEntry1] :   Patient is here in the office for face to face interview with writer for 1 month follow-up visit.  Last month Naltrexone 50 mg was started on the patient. Patient states he saw a difference on the Naltrexone. Less cravings. Used to drink 6-8 beers per day. Now drinks 0-2 beers per day 5 out 7 days a week. States he feels better as his not drinking as much.   Mood: stable. Depression and anxiety are manageable.  Sleeping poor sleep hygiene. Takes Melatonin which has helped him. Gets 7-8 hours of sleep broken.   Appetite: Weight 151 Ht 5'3 Energy: little better. Has joined [employer].  Concentration: decreased Motivation is decreased Denies any AVH, SI or HI. Smokes 1 cigar per night Wants to decrease his cigars and alcohol.

## 2024-03-04 NOTE — CURRENT PSYCHIATRIC SYMPTOMS
[de-identified] : Denied [de-identified] : Denied [de-identified] : Denied [de-identified] : None [de-identified] : Denied [de-identified] : None [de-identified] : None

## 2024-03-04 NOTE — FAMILY HISTORY
[FreeTextEntry1] : Patient born in [facility] mother had Alzheimer's disease she was depressed paranoid Father  of heart disease he has a sister and brother who are twins age 68.  No other psychiatric alcohol or drug history in the family.  No abuse history growing up.

## 2024-03-05 DIAGNOSIS — E87.1 HYPO-OSMOLALITY AND HYPONATREMIA: ICD-10-CM

## 2024-03-05 LAB
ALBUMIN SERPL ELPH-MCNC: 4.3 G/DL
ALP BLD-CCNC: 63 U/L
ALT SERPL-CCNC: 44 U/L
ANION GAP SERPL CALC-SCNC: 12 MMOL/L
AST SERPL-CCNC: 36 U/L
BILIRUB SERPL-MCNC: 0.4 MG/DL
BUN SERPL-MCNC: 15 MG/DL
CALCIUM SERPL-MCNC: 9.3 MG/DL
CHLORIDE SERPL-SCNC: 94 MMOL/L
CO2 SERPL-SCNC: 26 MMOL/L
CREAT SERPL-MCNC: 1.07 MG/DL
EGFR: 74 ML/MIN/1.73M2
GLUCOSE SERPL-MCNC: 203 MG/DL
POTASSIUM SERPL-SCNC: 4.5 MMOL/L
PROT SERPL-MCNC: 6.9 G/DL
SODIUM SERPL-SCNC: 132 MMOL/L

## 2024-04-09 ENCOUNTER — TRANSCRIPTION ENCOUNTER (OUTPATIENT)
Age: 74
End: 2024-04-09

## 2024-04-09 LAB
OSMOLALITY SERPL: 290 MOSMOL/KG
OSMOLALITY UR: 262 MOSM/KG
SODIUM ?TM SUB UR QN: 32 MMOL/L
URATE SERPL-MCNC: 5.7 MG/DL
VASOPRESSIN SERPL-MCNC: <0.8 PG/ML

## 2024-04-16 RX ORDER — NEBIVOLOL 5 MG/1
5 TABLET ORAL
Qty: 90 | Refills: 1 | Status: ACTIVE | COMMUNITY
Start: 2023-08-16 | End: 1900-01-01

## 2024-04-18 ENCOUNTER — LABORATORY RESULT (OUTPATIENT)
Age: 74
End: 2024-04-18

## 2024-04-18 ENCOUNTER — APPOINTMENT (OUTPATIENT)
Dept: INTERNAL MEDICINE | Facility: CLINIC | Age: 74
End: 2024-04-18
Payer: MEDICARE

## 2024-04-18 VITALS — HEIGHT: 61 IN | WEIGHT: 146.38 LBS | BODY MASS INDEX: 27.64 KG/M2

## 2024-04-18 VITALS — DIASTOLIC BLOOD PRESSURE: 78 MMHG | SYSTOLIC BLOOD PRESSURE: 128 MMHG

## 2024-04-18 DIAGNOSIS — I47.19 OTHER SUPRAVENTRICULAR TACHYCARDIA: ICD-10-CM

## 2024-04-18 DIAGNOSIS — N40.1 BENIGN PROSTATIC HYPERPLASIA WITH LOWER URINARY TRACT SYMPMS: ICD-10-CM

## 2024-04-18 DIAGNOSIS — E03.9 HYPOTHYROIDISM, UNSPECIFIED: ICD-10-CM

## 2024-04-18 DIAGNOSIS — N13.8 BENIGN PROSTATIC HYPERPLASIA WITH LOWER URINARY TRACT SYMPMS: ICD-10-CM

## 2024-04-18 DIAGNOSIS — E22.2 SYNDROME OF INAPPROPRIATE SECRETION OF ANTIDIURETIC HORMONE: ICD-10-CM

## 2024-04-18 DIAGNOSIS — Z00.00 ENCOUNTER FOR GENERAL ADULT MEDICAL EXAMINATION W/OUT ABNORMAL FINDINGS: ICD-10-CM

## 2024-04-18 DIAGNOSIS — F39 UNSPECIFIED MOOD [AFFECTIVE] DISORDER: ICD-10-CM

## 2024-04-18 PROCEDURE — 36415 COLL VENOUS BLD VENIPUNCTURE: CPT

## 2024-04-18 PROCEDURE — G0439: CPT

## 2024-04-18 NOTE — HEALTH RISK ASSESSMENT
[No] : No [Never (0 pts)] : Never (0 points) [No falls in past year] : Patient reported no falls in the past year [0] : 2) Feeling down, depressed, or hopeless: Not at all (0) [PHQ-2 Negative - No further assessment needed] : PHQ-2 Negative - No further assessment needed [[id number]] : 0 [Fully functional (bathing, dressing, toileting, transferring, walking, feeding)] : Fully functional (bathing, dressing, toileting, transferring, walking, feeding) [Fully functional (using the telephone, shopping, preparing meals, housekeeping, doing laundry, using] : Fully functional and needs no help or supervision to perform IADLs (using the telephone, shopping, preparing meals, housekeeping, doing laundry, using transportation, managing medications and managing finances) [Never] : Never

## 2024-04-18 NOTE — HISTORY OF PRESENT ILLNESS
[de-identified] : This is a 73 -year-old patient with a history of elevated PSA level, herniated cervical disc, hypercholesterolemia, thyroid disorder, mood disorder who is here today for his annual well exam

## 2024-04-19 ENCOUNTER — TRANSCRIPTION ENCOUNTER (OUTPATIENT)
Age: 74
End: 2024-04-19

## 2024-04-19 LAB
25(OH)D3 SERPL-MCNC: 45.1 NG/ML
ALBUMIN SERPL ELPH-MCNC: 4.6 G/DL
ALP BLD-CCNC: 53 U/L
ALT SERPL-CCNC: 51 U/L
ANION GAP SERPL CALC-SCNC: 13 MMOL/L
APPEARANCE: CLEAR
AST SERPL-CCNC: 53 U/L
BACTERIA: NEGATIVE /HPF
BASOPHILS # BLD AUTO: 0.03 K/UL
BASOPHILS NFR BLD AUTO: 0.4 %
BILIRUB SERPL-MCNC: 0.4 MG/DL
BILIRUBIN URINE: NEGATIVE
BLOOD URINE: NEGATIVE
BUN SERPL-MCNC: 14 MG/DL
CALCIUM SERPL-MCNC: 9.3 MG/DL
CAST: 0 /LPF
CHLORIDE SERPL-SCNC: 95 MMOL/L
CHOLEST SERPL-MCNC: 169 MG/DL
CO2 SERPL-SCNC: 24 MMOL/L
COLOR: YELLOW
CREAT SERPL-MCNC: 1.01 MG/DL
EGFR: 79 ML/MIN/1.73M2
EOSINOPHIL # BLD AUTO: 0.02 K/UL
EOSINOPHIL NFR BLD AUTO: 0.3 %
EPITHELIAL CELLS: 0 /HPF
ESTIMATED AVERAGE GLUCOSE: 126 MG/DL
FERRITIN SERPL-MCNC: 535 NG/ML
FOLATE SERPL-MCNC: >20 NG/ML
GLUCOSE QUALITATIVE U: NEGATIVE MG/DL
GLUCOSE SERPL-MCNC: 141 MG/DL
HBA1C MFR BLD HPLC: 6 %
HCT VFR BLD CALC: 37.6 %
HDLC SERPL-MCNC: 47 MG/DL
HGB BLD-MCNC: 12.9 G/DL
IMM GRANULOCYTES NFR BLD AUTO: 0.7 %
KETONES URINE: NEGATIVE MG/DL
LDLC SERPL CALC-MCNC: 80 MG/DL
LEUKOCYTE ESTERASE URINE: NEGATIVE
LYMPHOCYTES # BLD AUTO: 0.86 K/UL
LYMPHOCYTES NFR BLD AUTO: 12.5 %
MAN DIFF?: NORMAL
MCHC RBC-ENTMCNC: 31.9 PG
MCHC RBC-ENTMCNC: 34.3 GM/DL
MCV RBC AUTO: 93.1 FL
MICROSCOPIC-UA: NORMAL
MONOCYTES # BLD AUTO: 0.64 K/UL
MONOCYTES NFR BLD AUTO: 9.3 %
NEUTROPHILS # BLD AUTO: 5.28 K/UL
NEUTROPHILS NFR BLD AUTO: 76.8 %
NITRITE URINE: NEGATIVE
NONHDLC SERPL-MCNC: 122 MG/DL
PH URINE: 6.5
PLATELET # BLD AUTO: 144 K/UL
POTASSIUM SERPL-SCNC: 4.5 MMOL/L
PROT SERPL-MCNC: 7.2 G/DL
PROTEIN URINE: NEGATIVE MG/DL
PSA SERPL-MCNC: 1.79 NG/ML
RBC # BLD: 4.04 M/UL
RBC # FLD: 11.9 %
RED BLOOD CELLS URINE: 0 /HPF
SODIUM SERPL-SCNC: 133 MMOL/L
SPECIFIC GRAVITY URINE: 1.01
T3RU NFR SERPL: <0.2 TBI
T4 SERPL-MCNC: 6.2 UG/DL
TRIGL SERPL-MCNC: 254 MG/DL
TSH SERPL-ACNC: 2.98 UIU/ML
URATE SERPL-MCNC: 7.4 MG/DL
UROBILINOGEN URINE: 0.2 MG/DL
VIT B12 SERPL-MCNC: 637 PG/ML
WBC # FLD AUTO: 6.88 K/UL
WHITE BLOOD CELLS URINE: 0 /HPF

## 2024-05-17 DIAGNOSIS — R19.7 DIARRHEA, UNSPECIFIED: ICD-10-CM

## 2024-05-20 LAB
CDIFF BY PCR: NOT DETECTED
DEPRECATED O AND P PREP STL: NORMAL

## 2024-05-22 ENCOUNTER — APPOINTMENT (OUTPATIENT)
Dept: DERMATOLOGY | Facility: CLINIC | Age: 74
End: 2024-05-22
Payer: MEDICARE

## 2024-05-22 VITALS — BODY MASS INDEX: 26.81 KG/M2 | HEIGHT: 61 IN | WEIGHT: 142 LBS

## 2024-05-22 DIAGNOSIS — D48.5 NEOPLASM OF UNCERTAIN BEHAVIOR OF SKIN: ICD-10-CM

## 2024-05-22 DIAGNOSIS — L82.1 OTHER SEBORRHEIC KERATOSIS: ICD-10-CM

## 2024-05-22 DIAGNOSIS — D22.9 MELANOCYTIC NEVI, UNSPECIFIED: ICD-10-CM

## 2024-05-22 LAB — BACTERIA STL CULT: NORMAL

## 2024-05-22 PROCEDURE — 11102 TANGNTL BX SKIN SINGLE LES: CPT

## 2024-05-22 PROCEDURE — 99203 OFFICE O/P NEW LOW 30 MIN: CPT | Mod: 25

## 2024-05-30 LAB — DERMATOLOGY BIOPSY: NORMAL

## 2024-05-30 NOTE — HISTORY OF PRESENT ILLNESS
[FreeTextEntry1] : NPV spots [de-identified] : DARIELA VELÁSQUEZ  is a pleasant 73 year old M who presents for the following:   - Various growths on the body. Came to derm d/t recommendation of his PCP  Requests [facility]BE  Hx of BCC on the left side of nose treated with Mohs 1.5 years ago at [facility]. No other skin cancers No fhx of melanoma  SH: used to work for [email or web address]. from [employer] originally

## 2024-05-30 NOTE — PHYSICAL EXAM
[FreeTextEntry3] : There was no visible lymphadenopathy. Conjunctiva were non-injected. There was no clubbing or edema of extremities. The scalp, hair, face, eyebrows, lips, oropharynx , neck, chest, abdomen, back, buttocks, extremities X 4, hands, feet, nails were examined. There was no hyperhidrosis or bromhidrosis. Patient denied a chaperone. The following lesions are noted:  Patient denied genital examination multiple stuck-on keratotic papules, fairly uniform and regular brown macules and papules, and small erythematous/blanching papules scattered throughout the body scarred linear plaque on the left medial canthus/nasal root

## 2024-05-30 NOTE — ASSESSMENT
[FreeTextEntry1] : #Neoplasm of uncertain significance - Location: left lower back - Ddx: nevus v MM - Biopsy was performed today for histologic correlation. Will contact pt with results and plan treatment considering histologic findings.  - Biopsy by shave. The risks/benefits/alternatives of skin biopsy were explained to the patient, which include and are not limited to bleeding, infection, scarring or discoloration of skin, and recurrence of lesion. Patient expressed understanding of these risks and provided consent to the procedure. Time out with verification of patient and lesion site was performed. Site was prepped with rubbing alcohol, lidocaine with epinephrine was injected for anesthesia, and biopsy was performed. Specimen sent to path. Procedure was without complication and well tolerated. I discussed wound care with the patient.  #Nevi, clinically benign appearing  - I have counseled the patient on the importance of sun protection  - We have discussed the signs of melanoma  #Seborrheic keratoses - Asx. I have discussed the nature and usual chronic course with the patient. Patient reassured  #Skin Cancer Screening -- Has hx of BCC x1 on the left nasal root/medial canthus - Discussed the need for sun protection, importance of sunscreen, or informing us of an new or changing lesions.  RTC 1 y

## 2024-06-03 ENCOUNTER — NON-APPOINTMENT (OUTPATIENT)
Age: 74
End: 2024-06-03

## 2024-06-03 ENCOUNTER — APPOINTMENT (OUTPATIENT)
Dept: PSYCHIATRY | Facility: CLINIC | Age: 74
End: 2024-06-03
Payer: MEDICARE

## 2024-06-03 PROCEDURE — 99214 OFFICE O/P EST MOD 30 MIN: CPT

## 2024-06-03 NOTE — PLAN
[FreeTextEntry5] : Assessment: Patient is a 72 yo male with h/o depression and anxiety seen today for medication management. Patient is compliant with the medications, tolerating it well without any side effects. I-STOP was checked without any problems.  PLAN: D/C Naltrexone 50 mg PO QD for alcohol use disorder D/C Zofran 4 mg PRN for nausea Continue Elavil 100 mg PO QHS for depression Continue Abilify 2 mg PO QHS for depression augmentation Continue Klonopin 0.5 mg BID PRN (Takes QD PRN) - Discussed risks and benefits of medications including side effects of GI and sexual with SSRI/SNRI/TCA. Alternative strategies including no intervention discussed with patient. Patient consents to current medications as prescribed. - Discussed with patient regarding importance of abstinence and sobriety from alcohol and drugs. Educated about relationship between worsening mood/anxiety symptoms and drug use and improvement of symptoms with abstinence.  - Discussed about unpredictable effects including cardiorespiratory collapse from the combination of illicit drugs and prescribed medications. Patient verbalized understanding. - Patient understands to contact clinic prn with concerns and agrees to call 911 or go to nearest ER if symptoms worsen. - Next appointment was made by the patient in 3 months Patient was not in any distress.

## 2024-06-03 NOTE — HISTORY OF PRESENT ILLNESS
[FreeTextEntry1] :   Patient is here in the office for face to face interview with writer for 3 month follow-up visit.  No medication changes. States he discontinued the Naltrexone 50 mg. Felt he didn't need it anymore. States he is drinking 2-3 beers per night. States it decreased from 6-8 beers per night.   Mood: stable. Depression and anxiety are manageable.  Sleeping poor sleep hygiene. Takes Melatonin which has helped him. Gets 7-8 hours of sleep broken.   Appetite: Weight 151 Ht 5'3 Energy: little better. Has joined [employer] but has not gone.   Concentration: OK Motivation is OK Denies any AVH, SI or HI. Smokes 1 cigar per night

## 2024-06-03 NOTE — SOCIAL HISTORY
[FreeTextEntry1] : Patient grew up in [place].  He attended [employer] graduating 1969.  High school was good he was a good student he then went to [employer] graduating in 1972 with an associates degree in electrical engineering.  In college she partied a lot with member of .  He then went to work for an [employer] company for 2 years and then for [employer] for 39 years.  He retired in 2011.  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  Patient was  in 2003.  At his first marriage.  His stepson is age 40.

## 2024-06-03 NOTE — CURRENT PSYCHIATRIC SYMPTOMS
[de-identified] : Denied [de-identified] : Denied [de-identified] : Denied [de-identified] : Denied [de-identified] : None [de-identified] : None [de-identified] : None

## 2024-06-03 NOTE — FAMILY HISTORY
[FreeTextEntry1] : Patient born in [facility] [facility] mother had Alzheimer's disease she was depressed paranoid Father  of heart disease he has a sister and brother who are twins age 68.  No other psychiatric alcohol or drug history in the family.  No abuse history growing up.

## 2024-06-26 DIAGNOSIS — K21.9 GASTRO-ESOPHAGEAL REFLUX DISEASE W/OUT ESOPHAGITIS: ICD-10-CM

## 2024-06-26 RX ORDER — OMEPRAZOLE 40 MG/1
40 CAPSULE, DELAYED RELEASE ORAL
Qty: 1 | Refills: 3 | Status: ACTIVE | COMMUNITY
Start: 2021-11-08 | End: 1900-01-01

## 2024-08-08 ENCOUNTER — LABORATORY RESULT (OUTPATIENT)
Age: 74
End: 2024-08-08

## 2024-08-08 ENCOUNTER — APPOINTMENT (OUTPATIENT)
Dept: INTERNAL MEDICINE | Facility: CLINIC | Age: 74
End: 2024-08-08

## 2024-08-08 PROCEDURE — 36415 COLL VENOUS BLD VENIPUNCTURE: CPT

## 2024-08-08 PROCEDURE — 99214 OFFICE O/P EST MOD 30 MIN: CPT

## 2024-08-08 PROCEDURE — G2211 COMPLEX E/M VISIT ADD ON: CPT

## 2024-08-08 NOTE — PHYSICAL EXAM
[No Mass] : no mass [Normal Sphincter Tone] : normal sphincter tone [Testes Mass (___cm)] : there were no testicular masses [Prostate Enlargement] : the prostate was not enlarged [Prostate Tenderness] : the prostate was not tender [No Prostate Nodules] : no prostate nodules [Normal] : normal gait, coordination grossly intact, no focal deficits and deep tendon reflexes were 2+ and symmetric

## 2024-08-08 NOTE — ASSESSMENT
[FreeTextEntry1] : Problems Hypertension Hypercholesterolemia Mood disorder Atrial tachycardia Assessment The patient blood pressure and physical examination were normal.  He recently underwent nuclear stress test which did not show any evidence of ischemia.  The patient presently continues on valsartan 160 mg, Bystolic 5 mg, Lipitor 40 mg, Synthroid 50 mcg, amitriptyline 100 mg, and Abilify 2 mg daily as these medications can affect kidney and liver function bloods were obtained to check his TSH level, lipids, liver function studies, renal function TSH level

## 2024-09-09 ENCOUNTER — RX RENEWAL (OUTPATIENT)
Age: 74
End: 2024-09-09

## 2024-09-13 ENCOUNTER — APPOINTMENT (OUTPATIENT)
Dept: PSYCHIATRY | Facility: CLINIC | Age: 74
End: 2024-09-13
Payer: MEDICARE

## 2024-09-13 PROCEDURE — 99214 OFFICE O/P EST MOD 30 MIN: CPT

## 2024-09-13 NOTE — PLAN
[FreeTextEntry5] : Assessment: Patient is a 72 yo male with h/o depression and anxiety seen today for medication management. Patient is compliant with the medications, tolerating it well without any side effects. I-STOP was checked without any problems.  PLAN: D/C Naltrexone 50 mg PO QD for alcohol use disorder D/C Zofran 4 mg PRN for nausea Continue Elavil 100 mg PO QHS for depression Continue Abilify 2 mg PO QHS for depression augmentation Continue Klonopin 0.5 mg BID PRN (Takes QD PRN) - Discussed risks and benefits of medications including side effects of GI and sexual with SSRI/SNRI/TCA. Alternative strategies including no intervention discussed with patient. Patient consents to current medications as prescribed. - Discussed with patient regarding importance of abstinence and sobriety from alcohol and drugs. Educated about relationship between worsening mood/anxiety symptoms and drug use and improvement of symptoms with abstinence.  - Discussed about unpredictable effects including cardiorespiratory collapse from the combination of illicit drugs and prescribed medications. Patient verbalized understanding. - Patient understands to contact clinic prn with concerns and agrees to call 911 or go to nearest ER if symptoms worsen. - Next appointment was made by the patient in 4 months Patient was not in any distress.

## 2024-09-13 NOTE — CURRENT PSYCHIATRIC SYMPTOMS
[de-identified] : Denied [de-identified] : Denied [de-identified] : Denied [de-identified] : Denied [de-identified] : None [de-identified] : None [de-identified] : None

## 2024-09-13 NOTE — HISTORY OF PRESENT ILLNESS
[FreeTextEntry1] :   Patient is here in the office for face to face interview with writer for follow-up visit.  Last seen in June. No medication changes. States he is drinking 2-3 beers per night. States it decreased from 6-8 beers per night.   Mood: stable. Depression and anxiety are manageable.  Sleeping is better. Takes Melatonin which has helped him. Gets 7-8 hours of sleep.   Appetite: Weight 151 Ht 5'3 Energy: is decreased. . States he is working out which is helping him. Lifting weights in his basement.    Concentration: OK Motivation is OK Denies any AVH, SI or HI. Smokes 1 cigar per night

## 2024-09-27 ENCOUNTER — RX RENEWAL (OUTPATIENT)
Age: 74
End: 2024-09-27

## 2024-11-15 ENCOUNTER — APPOINTMENT (OUTPATIENT)
Dept: INTERNAL MEDICINE | Facility: CLINIC | Age: 74
End: 2024-11-15
Payer: MEDICARE

## 2024-11-15 PROCEDURE — 99214 OFFICE O/P EST MOD 30 MIN: CPT

## 2024-11-18 LAB
ALBUMIN SERPL ELPH-MCNC: 4.1 G/DL
ALP BLD-CCNC: 49 U/L
ALT SERPL-CCNC: 45 U/L
ANION GAP SERPL CALC-SCNC: 12 MMOL/L
AST SERPL-CCNC: 33 U/L
BASOPHILS # BLD AUTO: 0.07 K/UL
BASOPHILS NFR BLD AUTO: 1 %
BILIRUB SERPL-MCNC: 0.3 MG/DL
BUN SERPL-MCNC: 14 MG/DL
CALCIUM SERPL-MCNC: 9.3 MG/DL
CHLORIDE SERPL-SCNC: 96 MMOL/L
CO2 SERPL-SCNC: 24 MMOL/L
CREAT SERPL-MCNC: 0.9 MG/DL
EGFR: 91 ML/MIN/1.73M2
EOSINOPHIL # BLD AUTO: 0.06 K/UL
EOSINOPHIL NFR BLD AUTO: 0.8 %
GLUCOSE SERPL-MCNC: 194 MG/DL
HCT VFR BLD CALC: 37.4 %
HGB BLD-MCNC: 12.8 G/DL
IMM GRANULOCYTES NFR BLD AUTO: 1.7 %
LYMPHOCYTES # BLD AUTO: 1.15 K/UL
LYMPHOCYTES NFR BLD AUTO: 16.2 %
MAN DIFF?: NORMAL
MCHC RBC-ENTMCNC: 32.5 PG
MCHC RBC-ENTMCNC: 34.2 G/DL
MCV RBC AUTO: 94.9 FL
MONOCYTES # BLD AUTO: 1.05 K/UL
MONOCYTES NFR BLD AUTO: 14.8 %
NEUTROPHILS # BLD AUTO: 4.63 K/UL
NEUTROPHILS NFR BLD AUTO: 65.5 %
PLATELET # BLD AUTO: 157 K/UL
POTASSIUM SERPL-SCNC: 4.6 MMOL/L
PROT SERPL-MCNC: 6.5 G/DL
RBC # BLD: 3.94 M/UL
RBC # FLD: 12 %
SODIUM SERPL-SCNC: 132 MMOL/L
WBC # FLD AUTO: 7.08 K/UL

## 2024-11-21 LAB
BACTERIA STL CULT: NORMAL
CDIFF BY PCR: NOT DETECTED
DEPRECATED O AND P PREP STL: NORMAL

## 2024-11-25 ENCOUNTER — APPOINTMENT (OUTPATIENT)
Dept: GASTROENTEROLOGY | Facility: CLINIC | Age: 74
End: 2024-11-25
Payer: MEDICARE

## 2024-11-25 VITALS
SYSTOLIC BLOOD PRESSURE: 163 MMHG | BODY MASS INDEX: 27.38 KG/M2 | OXYGEN SATURATION: 98 % | DIASTOLIC BLOOD PRESSURE: 67 MMHG | WEIGHT: 145 LBS | HEART RATE: 75 BPM | HEIGHT: 61 IN | RESPIRATION RATE: 16 BRPM | TEMPERATURE: 98.2 F

## 2024-11-25 DIAGNOSIS — R19.7 DIARRHEA, UNSPECIFIED: ICD-10-CM

## 2024-11-25 PROCEDURE — 99203 OFFICE O/P NEW LOW 30 MIN: CPT

## 2025-01-06 ENCOUNTER — APPOINTMENT (OUTPATIENT)
Dept: PSYCHIATRY | Facility: CLINIC | Age: 75
End: 2025-01-06

## 2025-02-03 RX ORDER — OMEGA-3-ACID ETHYL ESTERS CAPSULES 1 G/1
1 CAPSULE, LIQUID FILLED ORAL DAILY
Qty: 120 | Refills: 0 | Status: ACTIVE | COMMUNITY
Start: 2025-02-03 | End: 1900-01-01

## 2025-02-12 ENCOUNTER — APPOINTMENT (OUTPATIENT)
Dept: PSYCHIATRY | Facility: CLINIC | Age: 75
End: 2025-02-12
Payer: MEDICARE

## 2025-02-12 PROCEDURE — 99214 OFFICE O/P EST MOD 30 MIN: CPT

## 2025-02-12 RX ORDER — NALTREXONE HYDROCHLORIDE 50 MG/1
50 TABLET, FILM COATED ORAL
Qty: 30 | Refills: 1 | Status: ACTIVE | COMMUNITY
Start: 2025-02-12 | End: 1900-01-01

## 2025-03-04 ENCOUNTER — APPOINTMENT (OUTPATIENT)
Dept: INTERNAL MEDICINE | Facility: CLINIC | Age: 75
End: 2025-03-04
Payer: MEDICARE

## 2025-03-04 ENCOUNTER — RX RENEWAL (OUTPATIENT)
Age: 75
End: 2025-03-04

## 2025-03-04 VITALS
OXYGEN SATURATION: 100 % | HEIGHT: 61 IN | SYSTOLIC BLOOD PRESSURE: 142 MMHG | HEART RATE: 60 BPM | WEIGHT: 148 LBS | DIASTOLIC BLOOD PRESSURE: 66 MMHG | BODY MASS INDEX: 27.94 KG/M2

## 2025-03-04 DIAGNOSIS — R19.7 DIARRHEA, UNSPECIFIED: ICD-10-CM

## 2025-03-04 DIAGNOSIS — F33.9 MAJOR DEPRESSIVE DISORDER, RECURRENT, UNSPECIFIED: ICD-10-CM

## 2025-03-04 DIAGNOSIS — E03.9 HYPOTHYROIDISM, UNSPECIFIED: ICD-10-CM

## 2025-03-04 DIAGNOSIS — I47.19 OTHER SUPRAVENTRICULAR TACHYCARDIA: ICD-10-CM

## 2025-03-04 DIAGNOSIS — E78.00 PURE HYPERCHOLESTEROLEMIA, UNSPECIFIED: ICD-10-CM

## 2025-03-04 DIAGNOSIS — I10 ESSENTIAL (PRIMARY) HYPERTENSION: ICD-10-CM

## 2025-03-04 PROCEDURE — 36415 COLL VENOUS BLD VENIPUNCTURE: CPT

## 2025-03-04 PROCEDURE — 99214 OFFICE O/P EST MOD 30 MIN: CPT

## 2025-03-06 LAB
ALBUMIN SERPL ELPH-MCNC: 4.3 G/DL
ALP BLD-CCNC: 50 U/L
ALT SERPL-CCNC: 38 U/L
ANION GAP SERPL CALC-SCNC: 9 MMOL/L
AST SERPL-CCNC: 33 U/L
BASOPHILS # BLD AUTO: 0.04 K/UL
BASOPHILS NFR BLD AUTO: 0.7 %
BILIRUB SERPL-MCNC: 0.4 MG/DL
BUN SERPL-MCNC: 17 MG/DL
CALCIUM SERPL-MCNC: 9.2 MG/DL
CHLORIDE SERPL-SCNC: 99 MMOL/L
CHOLEST SERPL-MCNC: 121 MG/DL
CO2 SERPL-SCNC: 27 MMOL/L
CREAT SERPL-MCNC: 0.96 MG/DL
EGFRCR SERPLBLD CKD-EPI 2021: 83 ML/MIN/1.73M2
EOSINOPHIL # BLD AUTO: 0.07 K/UL
EOSINOPHIL NFR BLD AUTO: 1.2 %
GLUCOSE SERPL-MCNC: 137 MG/DL
HCT VFR BLD CALC: 40.5 %
HDLC SERPL-MCNC: 40 MG/DL
HGB BLD-MCNC: 13.2 G/DL
IMM GRANULOCYTES NFR BLD AUTO: 0.5 %
LDLC SERPL CALC-MCNC: 62 MG/DL
LYMPHOCYTES # BLD AUTO: 0.97 K/UL
LYMPHOCYTES NFR BLD AUTO: 16.4 %
MAN DIFF?: NORMAL
MCHC RBC-ENTMCNC: 32.5 PG
MCHC RBC-ENTMCNC: 32.6 G/DL
MCV RBC AUTO: 99.8 FL
MONOCYTES # BLD AUTO: 0.66 K/UL
MONOCYTES NFR BLD AUTO: 11.2 %
NEUTROPHILS # BLD AUTO: 4.13 K/UL
NEUTROPHILS NFR BLD AUTO: 70 %
NONHDLC SERPL-MCNC: 80 MG/DL
PLATELET # BLD AUTO: 179 K/UL
POTASSIUM SERPL-SCNC: 4.5 MMOL/L
PROT SERPL-MCNC: 6.6 G/DL
RBC # BLD: 4.06 M/UL
RBC # FLD: 11.9 %
SODIUM SERPL-SCNC: 135 MMOL/L
T4 SERPL-MCNC: 8.8 UG/DL
TRIGL SERPL-MCNC: 94 MG/DL
TSH SERPL-ACNC: 3.29 UIU/ML
WBC # FLD AUTO: 5.9 K/UL

## 2025-03-18 ENCOUNTER — OUTPATIENT (OUTPATIENT)
Dept: OUTPATIENT SERVICES | Facility: HOSPITAL | Age: 75
LOS: 1 days | End: 2025-03-18
Payer: SELF-PAY

## 2025-03-18 ENCOUNTER — APPOINTMENT (OUTPATIENT)
Dept: CT IMAGING | Facility: IMAGING CENTER | Age: 75
End: 2025-03-18
Payer: SELF-PAY

## 2025-03-18 DIAGNOSIS — Z00.8 ENCOUNTER FOR OTHER GENERAL EXAMINATION: ICD-10-CM

## 2025-03-18 DIAGNOSIS — E78.00 PURE HYPERCHOLESTEROLEMIA, UNSPECIFIED: ICD-10-CM

## 2025-03-18 PROCEDURE — 75571 CT HRT W/O DYE W/CA TEST: CPT

## 2025-03-18 PROCEDURE — 75571 CT HRT W/O DYE W/CA TEST: CPT | Mod: 26

## 2025-03-20 ENCOUNTER — NON-APPOINTMENT (OUTPATIENT)
Age: 75
End: 2025-03-20

## 2025-03-24 ENCOUNTER — NON-APPOINTMENT (OUTPATIENT)
Age: 75
End: 2025-03-24

## 2025-03-24 ENCOUNTER — APPOINTMENT (OUTPATIENT)
Dept: CARDIOLOGY | Facility: CLINIC | Age: 75
End: 2025-03-24
Payer: MEDICARE

## 2025-03-24 ENCOUNTER — RX RENEWAL (OUTPATIENT)
Age: 75
End: 2025-03-24

## 2025-03-24 VITALS — BODY MASS INDEX: 28.34 KG/M2 | OXYGEN SATURATION: 99 % | WEIGHT: 150 LBS | HEART RATE: 77 BPM

## 2025-03-24 VITALS — DIASTOLIC BLOOD PRESSURE: 78 MMHG | SYSTOLIC BLOOD PRESSURE: 140 MMHG

## 2025-03-24 DIAGNOSIS — I10 ESSENTIAL (PRIMARY) HYPERTENSION: ICD-10-CM

## 2025-03-24 DIAGNOSIS — R93.1 ABNORMAL FINDINGS ON DIAGNOSTIC IMAGING OF HEART AND CORONARY CIRCULATION: ICD-10-CM

## 2025-03-24 PROCEDURE — G2211 COMPLEX E/M VISIT ADD ON: CPT

## 2025-03-24 PROCEDURE — 99214 OFFICE O/P EST MOD 30 MIN: CPT

## 2025-03-24 PROCEDURE — 93000 ELECTROCARDIOGRAM COMPLETE: CPT

## 2025-03-28 ENCOUNTER — APPOINTMENT (OUTPATIENT)
Dept: CARDIOLOGY | Facility: CLINIC | Age: 75
End: 2025-03-28

## 2025-05-07 ENCOUNTER — APPOINTMENT (OUTPATIENT)
Dept: PSYCHIATRY | Facility: CLINIC | Age: 75
End: 2025-05-07
Payer: MEDICARE

## 2025-05-07 PROCEDURE — 99214 OFFICE O/P EST MOD 30 MIN: CPT

## 2025-06-19 ENCOUNTER — RX RENEWAL (OUTPATIENT)
Age: 75
End: 2025-06-19

## 2025-07-01 ENCOUNTER — LABORATORY RESULT (OUTPATIENT)
Age: 75
End: 2025-07-01

## 2025-07-01 ENCOUNTER — APPOINTMENT (OUTPATIENT)
Dept: INTERNAL MEDICINE | Facility: CLINIC | Age: 75
End: 2025-07-01
Payer: MEDICARE

## 2025-07-01 VITALS — HEIGHT: 62.6 IN | WEIGHT: 147.25 LBS | BODY MASS INDEX: 26.42 KG/M2

## 2025-07-01 VITALS — DIASTOLIC BLOOD PRESSURE: 78 MMHG | SYSTOLIC BLOOD PRESSURE: 138 MMHG

## 2025-07-01 PROCEDURE — G0439: CPT

## 2025-07-01 PROCEDURE — 36415 COLL VENOUS BLD VENIPUNCTURE: CPT

## 2025-07-01 RX ORDER — PANCRELIPASE LIPASE, PANCRELIPASE AMYLASE, AND PANCRELIPASE PROTEASE 2600; 10850; 6200 [USP'U]/1; [USP'U]/1; [USP'U]/1
2600-8800 CAPSULE, DELAYED RELEASE ORAL 3 TIMES DAILY
Qty: 90 | Refills: 3 | Status: ACTIVE | COMMUNITY
Start: 2025-07-01 | End: 1900-01-01

## 2025-07-02 LAB
ALBUMIN SERPL ELPH-MCNC: 4.5 G/DL
ALP BLD-CCNC: 55 U/L
ALT SERPL-CCNC: 42 U/L
ANION GAP SERPL CALC-SCNC: 15 MMOL/L
APPEARANCE: CLEAR
AST SERPL-CCNC: 39 U/L
BACTERIA: NEGATIVE /HPF
BASOPHILS # BLD AUTO: 0.03 K/UL
BASOPHILS NFR BLD AUTO: 0.5 %
BILIRUB SERPL-MCNC: 0.5 MG/DL
BILIRUBIN URINE: NEGATIVE
BLOOD URINE: NEGATIVE
BUN SERPL-MCNC: 16 MG/DL
CALCIUM SERPL-MCNC: 9.3 MG/DL
CAST: 0 /LPF
CHLORIDE SERPL-SCNC: 95 MMOL/L
CHOLEST SERPL-MCNC: 137 MG/DL
CO2 SERPL-SCNC: 24 MMOL/L
COLOR: YELLOW
CREAT SERPL-MCNC: 1.06 MG/DL
EGFRCR SERPLBLD CKD-EPI 2021: 74 ML/MIN/1.73M2
EOSINOPHIL # BLD AUTO: 0.11 K/UL
EOSINOPHIL NFR BLD AUTO: 1.7 %
EPITHELIAL CELLS: 0 /HPF
ESTIMATED AVERAGE GLUCOSE: 131 MG/DL
FOLATE SERPL-MCNC: 8.7 NG/ML
GLUCOSE QUALITATIVE U: NEGATIVE MG/DL
GLUCOSE SERPL-MCNC: 232 MG/DL
HBA1C MFR BLD HPLC: 6.2 %
HCT VFR BLD CALC: 39.4 %
HDLC SERPL-MCNC: 42 MG/DL
HGB BLD-MCNC: 13.5 G/DL
IMM GRANULOCYTES NFR BLD AUTO: 0.6 %
KETONES URINE: NEGATIVE MG/DL
LDLC SERPL-MCNC: 66 MG/DL
LEUKOCYTE ESTERASE URINE: ABNORMAL
LYMPHOCYTES # BLD AUTO: 1.37 K/UL
LYMPHOCYTES NFR BLD AUTO: 20.6 %
MAN DIFF?: NORMAL
MCHC RBC-ENTMCNC: 31.8 PG
MCHC RBC-ENTMCNC: 34.3 G/DL
MCV RBC AUTO: 92.9 FL
MICROSCOPIC-UA: NORMAL
MONOCYTES # BLD AUTO: 0.95 K/UL
MONOCYTES NFR BLD AUTO: 14.3 %
NEUTROPHILS # BLD AUTO: 4.14 K/UL
NEUTROPHILS NFR BLD AUTO: 62.3 %
NITRITE URINE: NEGATIVE
NONHDLC SERPL-MCNC: 95 MG/DL
PH URINE: 6
PLATELET # BLD AUTO: 135 K/UL
POTASSIUM SERPL-SCNC: 4.3 MMOL/L
PROT SERPL-MCNC: 6.8 G/DL
PROTEIN URINE: NEGATIVE MG/DL
PSA SERPL-MCNC: 1.29 NG/ML
RBC # BLD: 4.24 M/UL
RBC # FLD: 12.4 %
RED BLOOD CELLS URINE: 0 /HPF
SODIUM SERPL-SCNC: 134 MMOL/L
SPECIFIC GRAVITY URINE: 1.01
T3RU NFR SERPL: 0.4 TBI
T4 SERPL-MCNC: 7.1 UG/DL
TRIGL SERPL-MCNC: 174 MG/DL
TSH SERPL-ACNC: 4.45 UIU/ML
URATE SERPL-MCNC: 7.2 MG/DL
UROBILINOGEN URINE: 1 MG/DL
VIT B12 SERPL-MCNC: 408 PG/ML
WBC # FLD AUTO: 6.64 K/UL
WHITE BLOOD CELLS URINE: 0 /HPF

## 2025-07-03 ENCOUNTER — TRANSCRIPTION ENCOUNTER (OUTPATIENT)
Age: 75
End: 2025-07-03

## 2025-07-03 LAB — CELIAC PANEL: NORMAL

## 2025-08-11 ENCOUNTER — APPOINTMENT (OUTPATIENT)
Dept: UROLOGY | Facility: CLINIC | Age: 75
End: 2025-08-11
Payer: MEDICARE

## 2025-08-11 DIAGNOSIS — N40.1 BENIGN PROSTATIC HYPERPLASIA WITH LOWER URINARY TRACT SYMPMS: ICD-10-CM

## 2025-08-11 DIAGNOSIS — N13.8 BENIGN PROSTATIC HYPERPLASIA WITH LOWER URINARY TRACT SYMPMS: ICD-10-CM

## 2025-08-11 DIAGNOSIS — R97.20 ELEVATED PROSTATE, SPECIFIC ANTIGEN [PSA]: ICD-10-CM

## 2025-08-11 PROCEDURE — 99214 OFFICE O/P EST MOD 30 MIN: CPT

## 2025-08-11 PROCEDURE — G2211 COMPLEX E/M VISIT ADD ON: CPT

## 2025-08-19 ENCOUNTER — APPOINTMENT (OUTPATIENT)
Dept: PSYCHIATRY | Facility: CLINIC | Age: 75
End: 2025-08-19
Payer: MEDICARE

## 2025-08-19 PROCEDURE — 99214 OFFICE O/P EST MOD 30 MIN: CPT
